# Patient Record
Sex: FEMALE | Race: WHITE | Employment: PART TIME | URBAN - METROPOLITAN AREA
[De-identification: names, ages, dates, MRNs, and addresses within clinical notes are randomized per-mention and may not be internally consistent; named-entity substitution may affect disease eponyms.]

---

## 2017-07-28 ENCOUNTER — HOSPITAL ENCOUNTER (OUTPATIENT)
Dept: PREADMISSION TESTING | Age: 47
Discharge: HOME | End: 2017-07-28
Payer: COMMERCIAL

## 2017-07-28 LAB
ALBUMIN SERPL BCP-MCNC: 4.1 G/DL (ref 3.4–5)
ALBUMIN/GLOB SERPL: 1.3 {RATIO} (ref 0.8–1.7)
ALP SERPL-CCNC: 56 U/L (ref 45–117)
ALT SERPL-CCNC: 24 U/L (ref 13–56)
ANION GAP BLD CALC-SCNC: 6 MMOL/L (ref 3–18)
APPEARANCE UR: CLEAR
APTT PPP: 26.9 SEC (ref 23–36.4)
AST SERPL W P-5'-P-CCNC: 17 U/L (ref 15–37)
BACTERIA SPEC CULT: NORMAL
BACTERIA URNS QL MICRO: ABNORMAL /HPF
BASOPHILS # BLD AUTO: 0 K/UL (ref 0–0.06)
BASOPHILS # BLD: 0 % (ref 0–2)
BILIRUB SERPL-MCNC: 0.6 MG/DL (ref 0.2–1)
BILIRUB UR QL: NEGATIVE
BUN SERPL-MCNC: 14 MG/DL (ref 7–18)
BUN/CREAT SERPL: 15 (ref 12–20)
CALCIUM SERPL-MCNC: 9 MG/DL (ref 8.5–10.1)
CHLORIDE SERPL-SCNC: 105 MMOL/L (ref 100–108)
CO2 SERPL-SCNC: 30 MMOL/L (ref 21–32)
COLOR UR: YELLOW
CREAT SERPL-MCNC: 0.91 MG/DL (ref 0.6–1.3)
DIFFERENTIAL METHOD BLD: ABNORMAL
EOSINOPHIL # BLD: 0.3 K/UL (ref 0–0.4)
EOSINOPHIL NFR BLD: 6 % (ref 0–5)
EPITH CASTS URNS QL MICRO: ABNORMAL /LPF (ref 0–5)
ERYTHROCYTE [DISTWIDTH] IN BLOOD BY AUTOMATED COUNT: 12.8 % (ref 11.6–14.5)
ERYTHROCYTE [SEDIMENTATION RATE] IN BLOOD: 14 MM/HR (ref 0–20)
EST. AVERAGE GLUCOSE BLD GHB EST-MCNC: 97 MG/DL
GLOBULIN SER CALC-MCNC: 3.1 G/DL (ref 2–4)
GLUCOSE SERPL-MCNC: 89 MG/DL (ref 74–99)
GLUCOSE UR STRIP.AUTO-MCNC: NEGATIVE MG/DL
HBA1C MFR BLD: 5 % (ref 4.5–5.6)
HCT VFR BLD AUTO: 39 % (ref 35–45)
HGB BLD-MCNC: 13.2 G/DL (ref 12–16)
HGB UR QL STRIP: ABNORMAL
INR PPP: 1.1 (ref 0.8–1.2)
KETONES UR QL STRIP.AUTO: NEGATIVE MG/DL
LEUKOCYTE ESTERASE UR QL STRIP.AUTO: NEGATIVE
LYMPHOCYTES # BLD AUTO: 30 % (ref 21–52)
LYMPHOCYTES # BLD: 1.7 K/UL (ref 0.9–3.6)
MCH RBC QN AUTO: 29.3 PG (ref 24–34)
MCHC RBC AUTO-ENTMCNC: 33.8 G/DL (ref 31–37)
MCV RBC AUTO: 86.5 FL (ref 74–97)
MONOCYTES # BLD: 0.3 K/UL (ref 0.05–1.2)
MONOCYTES NFR BLD AUTO: 5 % (ref 3–10)
NEUTS SEG # BLD: 3.3 K/UL (ref 1.8–8)
NEUTS SEG NFR BLD AUTO: 59 % (ref 40–73)
NITRITE UR QL STRIP.AUTO: NEGATIVE
PH UR STRIP: 7 [PH] (ref 5–8)
PLATELET # BLD AUTO: 193 K/UL (ref 135–420)
PMV BLD AUTO: 11.2 FL (ref 9.2–11.8)
POTASSIUM SERPL-SCNC: 4.2 MMOL/L (ref 3.5–5.5)
PROT SERPL-MCNC: 7.2 G/DL (ref 6.4–8.2)
PROT UR STRIP-MCNC: NEGATIVE MG/DL
PROTHROMBIN TIME: 14 SEC (ref 11.5–15.2)
RBC # BLD AUTO: 4.51 M/UL (ref 4.2–5.3)
RBC #/AREA URNS HPF: ABNORMAL /HPF (ref 0–5)
SERVICE CMNT-IMP: NORMAL
SODIUM SERPL-SCNC: 141 MMOL/L (ref 136–145)
SP GR UR REFRACTOMETRY: 1.01 (ref 1–1.03)
UROBILINOGEN UR QL STRIP.AUTO: 0.2 EU/DL (ref 0.2–1)
WBC # BLD AUTO: 5.6 K/UL (ref 4.6–13.2)
WBC URNS QL MICRO: ABNORMAL /HPF (ref 0–5)

## 2017-07-28 PROCEDURE — 83036 HEMOGLOBIN GLYCOSYLATED A1C: CPT | Performed by: ORTHOPAEDIC SURGERY

## 2017-07-28 PROCEDURE — 81001 URINALYSIS AUTO W/SCOPE: CPT | Performed by: ORTHOPAEDIC SURGERY

## 2017-07-28 PROCEDURE — 36415 COLL VENOUS BLD VENIPUNCTURE: CPT | Performed by: ORTHOPAEDIC SURGERY

## 2017-07-28 PROCEDURE — 85025 COMPLETE CBC W/AUTO DIFF WBC: CPT | Performed by: ORTHOPAEDIC SURGERY

## 2017-07-28 PROCEDURE — 93005 ELECTROCARDIOGRAM TRACING: CPT

## 2017-07-28 PROCEDURE — 85610 PROTHROMBIN TIME: CPT | Performed by: ORTHOPAEDIC SURGERY

## 2017-07-28 PROCEDURE — 87641 MR-STAPH DNA AMP PROBE: CPT | Performed by: ORTHOPAEDIC SURGERY

## 2017-07-28 PROCEDURE — 85652 RBC SED RATE AUTOMATED: CPT | Performed by: ORTHOPAEDIC SURGERY

## 2017-07-28 PROCEDURE — 80053 COMPREHEN METABOLIC PANEL: CPT | Performed by: ORTHOPAEDIC SURGERY

## 2017-07-28 PROCEDURE — 85730 THROMBOPLASTIN TIME PARTIAL: CPT | Performed by: ORTHOPAEDIC SURGERY

## 2017-07-30 LAB
ATRIAL RATE: 69 BPM
CALCULATED P AXIS, ECG09: 52 DEGREES
CALCULATED R AXIS, ECG10: 65 DEGREES
CALCULATED T AXIS, ECG11: 53 DEGREES
DIAGNOSIS, 93000: NORMAL
P-R INTERVAL, ECG05: 160 MS
Q-T INTERVAL, ECG07: 416 MS
QRS DURATION, ECG06: 86 MS
QTC CALCULATION (BEZET), ECG08: 445 MS
VENTRICULAR RATE, ECG03: 69 BPM

## 2017-08-19 ENCOUNTER — ANESTHESIA EVENT (OUTPATIENT)
Dept: SURGERY | Age: 47
DRG: 470 | End: 2017-08-19
Payer: COMMERCIAL

## 2017-08-20 PROBLEM — M16.11 OSTEOARTHRITIS OF RIGHT HIP: Chronic | Status: ACTIVE | Noted: 2017-08-20

## 2017-08-21 ENCOUNTER — APPOINTMENT (OUTPATIENT)
Dept: GENERAL RADIOLOGY | Age: 47
DRG: 470 | End: 2017-08-21
Attending: PHYSICIAN ASSISTANT
Payer: COMMERCIAL

## 2017-08-21 ENCOUNTER — APPOINTMENT (OUTPATIENT)
Dept: GENERAL RADIOLOGY | Age: 47
DRG: 470 | End: 2017-08-21
Attending: ORTHOPAEDIC SURGERY
Payer: COMMERCIAL

## 2017-08-21 ENCOUNTER — HOSPITAL ENCOUNTER (INPATIENT)
Age: 47
LOS: 1 days | Discharge: HOME HEALTH | DRG: 470 | End: 2017-08-22
Attending: ORTHOPAEDIC SURGERY | Admitting: ORTHOPAEDIC SURGERY
Payer: COMMERCIAL

## 2017-08-21 ENCOUNTER — ANESTHESIA (OUTPATIENT)
Dept: SURGERY | Age: 47
DRG: 470 | End: 2017-08-21
Payer: COMMERCIAL

## 2017-08-21 LAB
ABO + RH BLD: NORMAL
BLOOD GROUP ANTIBODIES SERPL: NORMAL
HCG SERPL QL: NEGATIVE
SPECIMEN EXP DATE BLD: NORMAL

## 2017-08-21 PROCEDURE — 77010033678 HC OXYGEN DAILY

## 2017-08-21 PROCEDURE — 77030027138 HC INCENT SPIROMETER -A: Performed by: ORTHOPAEDIC SURGERY

## 2017-08-21 PROCEDURE — 74011250636 HC RX REV CODE- 250/636: Performed by: ANESTHESIOLOGY

## 2017-08-21 PROCEDURE — 76010000149 HC OR TIME 1 TO 1.5 HR: Performed by: ORTHOPAEDIC SURGERY

## 2017-08-21 PROCEDURE — 76210000006 HC OR PH I REC 0.5 TO 1 HR: Performed by: ORTHOPAEDIC SURGERY

## 2017-08-21 PROCEDURE — 77030018836 HC SOL IRR NACL ICUM -A: Performed by: ORTHOPAEDIC SURGERY

## 2017-08-21 PROCEDURE — 77030031139 HC SUT VCRL2 J&J -A: Performed by: ORTHOPAEDIC SURGERY

## 2017-08-21 PROCEDURE — 77030011640 HC PAD GRND REM COVD -A: Performed by: ORTHOPAEDIC SURGERY

## 2017-08-21 PROCEDURE — 77030003666 HC NDL SPINAL BD -A: Performed by: ORTHOPAEDIC SURGERY

## 2017-08-21 PROCEDURE — 77030038010: Performed by: ORTHOPAEDIC SURGERY

## 2017-08-21 PROCEDURE — 97116 GAIT TRAINING THERAPY: CPT

## 2017-08-21 PROCEDURE — 77030020782 HC GWN BAIR PAWS FLX 3M -B: Performed by: ORTHOPAEDIC SURGERY

## 2017-08-21 PROCEDURE — 74011250636 HC RX REV CODE- 250/636: Performed by: ORTHOPAEDIC SURGERY

## 2017-08-21 PROCEDURE — 74011000250 HC RX REV CODE- 250

## 2017-08-21 PROCEDURE — 74011250636 HC RX REV CODE- 250/636: Performed by: PHYSICIAN ASSISTANT

## 2017-08-21 PROCEDURE — 65270000029 HC RM PRIVATE

## 2017-08-21 PROCEDURE — 77030013708 HC HNDPC SUC IRR PULS STRY –B: Performed by: ORTHOPAEDIC SURGERY

## 2017-08-21 PROCEDURE — C1776 JOINT DEVICE (IMPLANTABLE): HCPCS | Performed by: ORTHOPAEDIC SURGERY

## 2017-08-21 PROCEDURE — 0SR90JA REPLACEMENT OF RIGHT HIP JOINT WITH SYNTHETIC SUBSTITUTE, UNCEMENTED, OPEN APPROACH: ICD-10-PCS | Performed by: ORTHOPAEDIC SURGERY

## 2017-08-21 PROCEDURE — 86900 BLOOD TYPING SEROLOGIC ABO: CPT | Performed by: ORTHOPAEDIC SURGERY

## 2017-08-21 PROCEDURE — 74011250637 HC RX REV CODE- 250/637: Performed by: PHYSICIAN ASSISTANT

## 2017-08-21 PROCEDURE — 73501 X-RAY EXAM HIP UNI 1 VIEW: CPT

## 2017-08-21 PROCEDURE — 77030034479 HC ADH SKN CLSR PRINEO J&J -B: Performed by: ORTHOPAEDIC SURGERY

## 2017-08-21 PROCEDURE — 74011250636 HC RX REV CODE- 250/636

## 2017-08-21 PROCEDURE — 84703 CHORIONIC GONADOTROPIN ASSAY: CPT | Performed by: ANESTHESIOLOGY

## 2017-08-21 PROCEDURE — 97161 PT EVAL LOW COMPLEX 20 MIN: CPT

## 2017-08-21 PROCEDURE — 77030034694 HC SCPL CANADY PLSM DISP USMD -E: Performed by: ORTHOPAEDIC SURGERY

## 2017-08-21 PROCEDURE — 77030012508 HC MSK AIRWY LMA AMBU -A: Performed by: ANESTHESIOLOGY

## 2017-08-21 PROCEDURE — 76060000033 HC ANESTHESIA 1 TO 1.5 HR: Performed by: ORTHOPAEDIC SURGERY

## 2017-08-21 PROCEDURE — 77030033263 HC DRSG MEPILEX 16-48IN BORD MOLN -B: Performed by: ORTHOPAEDIC SURGERY

## 2017-08-21 PROCEDURE — 74011000258 HC RX REV CODE- 258

## 2017-08-21 PROCEDURE — 77030032490 HC SLV COMPR SCD KNE COVD -B: Performed by: ORTHOPAEDIC SURGERY

## 2017-08-21 PROCEDURE — 36415 COLL VENOUS BLD VENIPUNCTURE: CPT | Performed by: ORTHOPAEDIC SURGERY

## 2017-08-21 PROCEDURE — 74011250637 HC RX REV CODE- 250/637: Performed by: ANESTHESIOLOGY

## 2017-08-21 PROCEDURE — 77030002934 HC SUT MCRYL J&J -B: Performed by: ORTHOPAEDIC SURGERY

## 2017-08-21 DEVICE — HEAD FEM DIA36MM +8MM OFFSET 12/14 TAPR HIP CERAMIC BIOLOX: Type: IMPLANTABLE DEVICE | Site: HIP | Status: FUNCTIONAL

## 2017-08-21 DEVICE — CUP ACET DIA54MM HIP GRIPTION PRI CEMENTLESS FIX SECT SER: Type: IMPLANTABLE DEVICE | Site: HIP | Status: FUNCTIONAL

## 2017-08-21 DEVICE — COMPONENT TOT HIP PRIMARY CERM ALTRX: Type: IMPLANTABLE DEVICE | Site: HIP | Status: FUNCTIONAL

## 2017-08-21 DEVICE — LINER ACET OD54MM ID36MM HIP ALTRX PINN: Type: IMPLANTABLE DEVICE | Site: HIP | Status: FUNCTIONAL

## 2017-08-21 RX ORDER — SODIUM CHLORIDE 0.9 % (FLUSH) 0.9 %
5-10 SYRINGE (ML) INJECTION EVERY 8 HOURS
Status: DISCONTINUED | OUTPATIENT
Start: 2017-08-21 | End: 2017-08-22 | Stop reason: HOSPADM

## 2017-08-21 RX ORDER — GLYCOPYRROLATE 0.2 MG/ML
INJECTION INTRAMUSCULAR; INTRAVENOUS AS NEEDED
Status: DISCONTINUED | OUTPATIENT
Start: 2017-08-21 | End: 2017-08-21 | Stop reason: HOSPADM

## 2017-08-21 RX ORDER — ONDANSETRON 2 MG/ML
4 INJECTION INTRAMUSCULAR; INTRAVENOUS
Status: DISCONTINUED | OUTPATIENT
Start: 2017-08-21 | End: 2017-08-22 | Stop reason: HOSPADM

## 2017-08-21 RX ORDER — MIDAZOLAM HYDROCHLORIDE 1 MG/ML
INJECTION, SOLUTION INTRAMUSCULAR; INTRAVENOUS AS NEEDED
Status: DISCONTINUED | OUTPATIENT
Start: 2017-08-21 | End: 2017-08-21 | Stop reason: HOSPADM

## 2017-08-21 RX ORDER — PROPOFOL 10 MG/ML
INJECTION, EMULSION INTRAVENOUS AS NEEDED
Status: DISCONTINUED | OUTPATIENT
Start: 2017-08-21 | End: 2017-08-21 | Stop reason: HOSPADM

## 2017-08-21 RX ORDER — LANOLIN ALCOHOL/MO/W.PET/CERES
1 CREAM (GRAM) TOPICAL 3 TIMES DAILY
Status: DISCONTINUED | OUTPATIENT
Start: 2017-08-21 | End: 2017-08-22 | Stop reason: HOSPADM

## 2017-08-21 RX ORDER — PREGABALIN 50 MG/1
50 CAPSULE ORAL
Status: COMPLETED | OUTPATIENT
Start: 2017-08-21 | End: 2017-08-21

## 2017-08-21 RX ORDER — DEXTROSE 50 % IN WATER (D50W) INTRAVENOUS SYRINGE
25-50 AS NEEDED
Status: DISCONTINUED | OUTPATIENT
Start: 2017-08-21 | End: 2017-08-22 | Stop reason: HOSPADM

## 2017-08-21 RX ORDER — OXYCODONE AND ACETAMINOPHEN 5; 325 MG/1; MG/1
TABLET ORAL
Qty: 60 TAB | Refills: 0 | Status: SHIPPED | OUTPATIENT
Start: 2017-08-21

## 2017-08-21 RX ORDER — KETOROLAC TROMETHAMINE 30 MG/ML
INJECTION, SOLUTION INTRAMUSCULAR; INTRAVENOUS AS NEEDED
Status: DISCONTINUED | OUTPATIENT
Start: 2017-08-21 | End: 2017-08-21 | Stop reason: HOSPADM

## 2017-08-21 RX ORDER — PANTOPRAZOLE SODIUM 40 MG/1
40 TABLET, DELAYED RELEASE ORAL DAILY
Status: DISCONTINUED | OUTPATIENT
Start: 2017-08-21 | End: 2017-08-22 | Stop reason: HOSPADM

## 2017-08-21 RX ORDER — DEXAMETHASONE SODIUM PHOSPHATE 4 MG/ML
INJECTION, SOLUTION INTRA-ARTICULAR; INTRALESIONAL; INTRAMUSCULAR; INTRAVENOUS; SOFT TISSUE AS NEEDED
Status: DISCONTINUED | OUTPATIENT
Start: 2017-08-21 | End: 2017-08-21 | Stop reason: HOSPADM

## 2017-08-21 RX ORDER — LIDOCAINE HYDROCHLORIDE 20 MG/ML
INJECTION, SOLUTION EPIDURAL; INFILTRATION; INTRACAUDAL; PERINEURAL AS NEEDED
Status: DISCONTINUED | OUTPATIENT
Start: 2017-08-21 | End: 2017-08-21 | Stop reason: HOSPADM

## 2017-08-21 RX ORDER — ACETAMINOPHEN 10 MG/ML
1000 INJECTION, SOLUTION INTRAVENOUS EVERY 6 HOURS
Status: DISCONTINUED | OUTPATIENT
Start: 2017-08-21 | End: 2017-08-21

## 2017-08-21 RX ORDER — FENTANYL CITRATE 50 UG/ML
INJECTION, SOLUTION INTRAMUSCULAR; INTRAVENOUS AS NEEDED
Status: DISCONTINUED | OUTPATIENT
Start: 2017-08-21 | End: 2017-08-21 | Stop reason: HOSPADM

## 2017-08-21 RX ORDER — ASPIRIN 325 MG
325 TABLET ORAL 2 TIMES DAILY
Qty: 42 TAB | Refills: 0 | Status: SHIPPED | OUTPATIENT
Start: 2017-08-21 | End: 2017-09-11

## 2017-08-21 RX ORDER — NALOXONE HYDROCHLORIDE 0.4 MG/ML
0.1 INJECTION, SOLUTION INTRAMUSCULAR; INTRAVENOUS; SUBCUTANEOUS AS NEEDED
Status: DISCONTINUED | OUTPATIENT
Start: 2017-08-21 | End: 2017-08-22 | Stop reason: HOSPADM

## 2017-08-21 RX ORDER — SODIUM CHLORIDE 0.9 % (FLUSH) 0.9 %
5-10 SYRINGE (ML) INJECTION AS NEEDED
Status: DISCONTINUED | OUTPATIENT
Start: 2017-08-21 | End: 2017-08-22 | Stop reason: HOSPADM

## 2017-08-21 RX ORDER — DIPHENHYDRAMINE HYDROCHLORIDE 50 MG/ML
12.5 INJECTION, SOLUTION INTRAMUSCULAR; INTRAVENOUS
Status: DISCONTINUED | OUTPATIENT
Start: 2017-08-21 | End: 2017-08-22 | Stop reason: HOSPADM

## 2017-08-21 RX ORDER — OXYCODONE HYDROCHLORIDE 5 MG/1
5-10 TABLET ORAL
Status: DISCONTINUED | OUTPATIENT
Start: 2017-08-21 | End: 2017-08-22 | Stop reason: HOSPADM

## 2017-08-21 RX ORDER — DOCUSATE SODIUM 100 MG/1
100 CAPSULE, LIQUID FILLED ORAL 2 TIMES DAILY
Status: DISCONTINUED | OUTPATIENT
Start: 2017-08-21 | End: 2017-08-22 | Stop reason: HOSPADM

## 2017-08-21 RX ORDER — METOCLOPRAMIDE HYDROCHLORIDE 5 MG/ML
10 INJECTION INTRAMUSCULAR; INTRAVENOUS
Status: DISCONTINUED | OUTPATIENT
Start: 2017-08-21 | End: 2017-08-22 | Stop reason: HOSPADM

## 2017-08-21 RX ORDER — SODIUM CHLORIDE 9 MG/ML
300 INJECTION, SOLUTION INTRAVENOUS CONTINUOUS
Status: DISPENSED | OUTPATIENT
Start: 2017-08-21 | End: 2017-08-21

## 2017-08-21 RX ORDER — ACETAMINOPHEN 10 MG/ML
1000 INJECTION, SOLUTION INTRAVENOUS ONCE
Status: COMPLETED | OUTPATIENT
Start: 2017-08-21 | End: 2017-08-21

## 2017-08-21 RX ORDER — ONDANSETRON 2 MG/ML
INJECTION INTRAMUSCULAR; INTRAVENOUS AS NEEDED
Status: DISCONTINUED | OUTPATIENT
Start: 2017-08-21 | End: 2017-08-21 | Stop reason: HOSPADM

## 2017-08-21 RX ORDER — OXYCODONE HCL 10 MG/1
10 TABLET, FILM COATED, EXTENDED RELEASE ORAL ONCE
Status: COMPLETED | OUTPATIENT
Start: 2017-08-21 | End: 2017-08-21

## 2017-08-21 RX ORDER — KETOROLAC TROMETHAMINE 15 MG/ML
15 INJECTION, SOLUTION INTRAMUSCULAR; INTRAVENOUS EVERY 6 HOURS
Status: DISCONTINUED | OUTPATIENT
Start: 2017-08-21 | End: 2017-08-22 | Stop reason: HOSPADM

## 2017-08-21 RX ORDER — ASPIRIN 325 MG/1
325 TABLET, FILM COATED ORAL
Status: DISCONTINUED | OUTPATIENT
Start: 2017-08-21 | End: 2017-08-22 | Stop reason: HOSPADM

## 2017-08-21 RX ORDER — CEFAZOLIN SODIUM 2 G/50ML
2 SOLUTION INTRAVENOUS EVERY 8 HOURS
Status: COMPLETED | OUTPATIENT
Start: 2017-08-21 | End: 2017-08-22

## 2017-08-21 RX ORDER — LORATADINE 10 MG/1
10 TABLET ORAL DAILY PRN
Status: DISCONTINUED | OUTPATIENT
Start: 2017-08-21 | End: 2017-08-22 | Stop reason: HOSPADM

## 2017-08-21 RX ORDER — MELATONIN
2000 DAILY
Status: DISCONTINUED | OUTPATIENT
Start: 2017-08-21 | End: 2017-08-22 | Stop reason: HOSPADM

## 2017-08-21 RX ORDER — SODIUM CHLORIDE, SODIUM LACTATE, POTASSIUM CHLORIDE, CALCIUM CHLORIDE 600; 310; 30; 20 MG/100ML; MG/100ML; MG/100ML; MG/100ML
125 INJECTION, SOLUTION INTRAVENOUS CONTINUOUS
Status: DISCONTINUED | OUTPATIENT
Start: 2017-08-21 | End: 2017-08-22 | Stop reason: HOSPADM

## 2017-08-21 RX ORDER — DIPHENHYDRAMINE HCL 25 MG
25 CAPSULE ORAL
Status: DISCONTINUED | OUTPATIENT
Start: 2017-08-21 | End: 2017-08-22 | Stop reason: HOSPADM

## 2017-08-21 RX ORDER — MELOXICAM 7.5 MG/1
7.5 TABLET ORAL 2 TIMES DAILY
Status: DISCONTINUED | OUTPATIENT
Start: 2017-08-21 | End: 2017-08-22 | Stop reason: HOSPADM

## 2017-08-21 RX ORDER — ZOLPIDEM TARTRATE 5 MG/1
5-10 TABLET ORAL
Status: DISCONTINUED | OUTPATIENT
Start: 2017-08-21 | End: 2017-08-22 | Stop reason: HOSPADM

## 2017-08-21 RX ORDER — HYDROMORPHONE HYDROCHLORIDE 2 MG/ML
INJECTION, SOLUTION INTRAMUSCULAR; INTRAVENOUS; SUBCUTANEOUS AS NEEDED
Status: DISCONTINUED | OUTPATIENT
Start: 2017-08-21 | End: 2017-08-21 | Stop reason: HOSPADM

## 2017-08-21 RX ORDER — ACETAMINOPHEN 325 MG/1
650 TABLET ORAL EVERY 6 HOURS
Status: DISCONTINUED | OUTPATIENT
Start: 2017-08-21 | End: 2017-08-22 | Stop reason: HOSPADM

## 2017-08-21 RX ORDER — MELOXICAM 7.5 MG/1
7.5 TABLET ORAL 2 TIMES DAILY
Qty: 28 TAB | Refills: 0 | Status: SHIPPED | OUTPATIENT
Start: 2017-08-21 | End: 2017-09-04

## 2017-08-21 RX ORDER — HYDROMORPHONE HYDROCHLORIDE 1 MG/ML
0.5 INJECTION, SOLUTION INTRAMUSCULAR; INTRAVENOUS; SUBCUTANEOUS
Status: DISCONTINUED | OUTPATIENT
Start: 2017-08-21 | End: 2017-08-22 | Stop reason: HOSPADM

## 2017-08-21 RX ORDER — MAGNESIUM SULFATE 100 %
4 CRYSTALS MISCELLANEOUS AS NEEDED
Status: DISCONTINUED | OUTPATIENT
Start: 2017-08-21 | End: 2017-08-22 | Stop reason: HOSPADM

## 2017-08-21 RX ORDER — FENTANYL CITRATE 50 UG/ML
50 INJECTION, SOLUTION INTRAMUSCULAR; INTRAVENOUS AS NEEDED
Status: DISCONTINUED | OUTPATIENT
Start: 2017-08-21 | End: 2017-08-22 | Stop reason: HOSPADM

## 2017-08-21 RX ORDER — SODIUM CHLORIDE 9 MG/ML
125 INJECTION, SOLUTION INTRAVENOUS CONTINUOUS
Status: DISPENSED | OUTPATIENT
Start: 2017-08-21 | End: 2017-08-22

## 2017-08-21 RX ORDER — CEFAZOLIN SODIUM 2 G/50ML
2 SOLUTION INTRAVENOUS ONCE
Status: COMPLETED | OUTPATIENT
Start: 2017-08-21 | End: 2017-08-21

## 2017-08-21 RX ORDER — LORATADINE 10 MG/1
10 TABLET ORAL DAILY
Status: DISCONTINUED | OUTPATIENT
Start: 2017-08-21 | End: 2017-08-22 | Stop reason: HOSPADM

## 2017-08-21 RX ORDER — NALOXONE HYDROCHLORIDE 0.4 MG/ML
0.4 INJECTION, SOLUTION INTRAMUSCULAR; INTRAVENOUS; SUBCUTANEOUS AS NEEDED
Status: DISCONTINUED | OUTPATIENT
Start: 2017-08-21 | End: 2017-08-22 | Stop reason: HOSPADM

## 2017-08-21 RX ORDER — CALCIUM CARBONATE 200(500)MG
200 TABLET,CHEWABLE ORAL DAILY PRN
Status: DISCONTINUED | OUTPATIENT
Start: 2017-08-21 | End: 2017-08-22 | Stop reason: HOSPADM

## 2017-08-21 RX ORDER — FLUTICASONE PROPIONATE 50 MCG
2 SPRAY, SUSPENSION (ML) NASAL DAILY PRN
Status: DISCONTINUED | OUTPATIENT
Start: 2017-08-21 | End: 2017-08-22 | Stop reason: HOSPADM

## 2017-08-21 RX ORDER — FLUMAZENIL 0.1 MG/ML
0.2 INJECTION INTRAVENOUS
Status: DISCONTINUED | OUTPATIENT
Start: 2017-08-21 | End: 2017-08-22 | Stop reason: HOSPADM

## 2017-08-21 RX ADMIN — SODIUM CHLORIDE, SODIUM LACTATE, POTASSIUM CHLORIDE, AND CALCIUM CHLORIDE 125 ML/HR: 600; 310; 30; 20 INJECTION, SOLUTION INTRAVENOUS at 06:41

## 2017-08-21 RX ADMIN — KETOROLAC TROMETHAMINE 15 MG: 15 INJECTION, SOLUTION INTRAMUSCULAR; INTRAVENOUS at 20:40

## 2017-08-21 RX ADMIN — ACETAMINOPHEN 1000 MG: 10 INJECTION, SOLUTION INTRAVENOUS at 07:30

## 2017-08-21 RX ADMIN — FENTANYL CITRATE 50 MCG: 50 INJECTION, SOLUTION INTRAMUSCULAR; INTRAVENOUS at 08:24

## 2017-08-21 RX ADMIN — PREGABALIN 50 MG: 50 CAPSULE ORAL at 07:11

## 2017-08-21 RX ADMIN — OXYCODONE HYDROCHLORIDE 5 MG: 5 TABLET ORAL at 18:37

## 2017-08-21 RX ADMIN — FENTANYL CITRATE 50 MCG: 50 INJECTION, SOLUTION INTRAMUSCULAR; INTRAVENOUS at 08:00

## 2017-08-21 RX ADMIN — METOCLOPRAMIDE 10 MG: 5 INJECTION, SOLUTION INTRAMUSCULAR; INTRAVENOUS at 10:22

## 2017-08-21 RX ADMIN — ACETAMINOPHEN 650 MG: 325 TABLET ORAL at 14:36

## 2017-08-21 RX ADMIN — ONDANSETRON 4 MG: 2 INJECTION INTRAMUSCULAR; INTRAVENOUS at 17:28

## 2017-08-21 RX ADMIN — ACETAMINOPHEN 650 MG: 325 TABLET ORAL at 20:40

## 2017-08-21 RX ADMIN — CEFAZOLIN SODIUM 2 G: 2 SOLUTION INTRAVENOUS at 23:35

## 2017-08-21 RX ADMIN — PROPOFOL 200 MG: 10 INJECTION, EMULSION INTRAVENOUS at 07:38

## 2017-08-21 RX ADMIN — KETOROLAC TROMETHAMINE 15 MG: 15 INJECTION, SOLUTION INTRAMUSCULAR; INTRAVENOUS at 14:35

## 2017-08-21 RX ADMIN — MELOXICAM 7.5 MG: 7.5 TABLET ORAL at 20:40

## 2017-08-21 RX ADMIN — FERROUS SULFATE TAB 325 MG (65 MG ELEMENTAL FE) 325 MG: 325 (65 FE) TAB at 20:45

## 2017-08-21 RX ADMIN — SODIUM CHLORIDE, SODIUM LACTATE, POTASSIUM CHLORIDE, AND CALCIUM CHLORIDE 125 ML/HR: 600; 310; 30; 20 INJECTION, SOLUTION INTRAVENOUS at 09:26

## 2017-08-21 RX ADMIN — ONDANSETRON 4 MG: 2 INJECTION INTRAMUSCULAR; INTRAVENOUS at 09:29

## 2017-08-21 RX ADMIN — DEXAMETHASONE SODIUM PHOSPHATE 4 MG: 4 INJECTION, SOLUTION INTRA-ARTICULAR; INTRALESIONAL; INTRAMUSCULAR; INTRAVENOUS; SOFT TISSUE at 07:40

## 2017-08-21 RX ADMIN — HYDROMORPHONE HYDROCHLORIDE 1 MG: 2 INJECTION, SOLUTION INTRAMUSCULAR; INTRAVENOUS; SUBCUTANEOUS at 07:38

## 2017-08-21 RX ADMIN — OXYCODONE HYDROCHLORIDE 10 MG: 10 TABLET, FILM COATED, EXTENDED RELEASE ORAL at 07:11

## 2017-08-21 RX ADMIN — FENTANYL CITRATE 100 MCG: 50 INJECTION, SOLUTION INTRAMUSCULAR; INTRAVENOUS at 07:38

## 2017-08-21 RX ADMIN — FERROUS SULFATE TAB 325 MG (65 MG ELEMENTAL FE) 325 MG: 325 (65 FE) TAB at 10:24

## 2017-08-21 RX ADMIN — SODIUM CHLORIDE 125 ML/HR: 900 INJECTION, SOLUTION INTRAVENOUS at 20:42

## 2017-08-21 RX ADMIN — KETOROLAC TROMETHAMINE 30 MG: 30 INJECTION, SOLUTION INTRAMUSCULAR; INTRAVENOUS at 08:41

## 2017-08-21 RX ADMIN — PANTOPRAZOLE SODIUM 40 MG: 40 TABLET, DELAYED RELEASE ORAL at 07:11

## 2017-08-21 RX ADMIN — MELOXICAM 7.5 MG: 7.5 TABLET ORAL at 10:24

## 2017-08-21 RX ADMIN — ASPIRIN 325 MG: 325 TABLET, FILM COATED ORAL at 17:27

## 2017-08-21 RX ADMIN — METOCLOPRAMIDE 10 MG: 5 INJECTION, SOLUTION INTRAMUSCULAR; INTRAVENOUS at 14:36

## 2017-08-21 RX ADMIN — GLYCOPYRROLATE 0.2 MG: 0.2 INJECTION INTRAMUSCULAR; INTRAVENOUS at 07:30

## 2017-08-21 RX ADMIN — OXYCODONE HYDROCHLORIDE 5 MG: 5 TABLET ORAL at 14:36

## 2017-08-21 RX ADMIN — ONDANSETRON 4 MG: 2 INJECTION INTRAMUSCULAR; INTRAVENOUS at 07:40

## 2017-08-21 RX ADMIN — CEFAZOLIN SODIUM 2 G: 2 SOLUTION INTRAVENOUS at 07:32

## 2017-08-21 RX ADMIN — CEFAZOLIN SODIUM 2 G: 2 SOLUTION INTRAVENOUS at 15:25

## 2017-08-21 RX ADMIN — FENTANYL CITRATE 50 MCG: 50 INJECTION, SOLUTION INTRAMUSCULAR; INTRAVENOUS at 08:07

## 2017-08-21 RX ADMIN — LORATADINE 10 MG: 10 TABLET ORAL at 10:25

## 2017-08-21 RX ADMIN — FERROUS SULFATE TAB 325 MG (65 MG ELEMENTAL FE) 325 MG: 325 (65 FE) TAB at 15:25

## 2017-08-21 RX ADMIN — ONDANSETRON 4 MG: 2 INJECTION INTRAMUSCULAR; INTRAVENOUS at 22:49

## 2017-08-21 RX ADMIN — DOCUSATE SODIUM 100 MG: 100 CAPSULE, LIQUID FILLED ORAL at 10:24

## 2017-08-21 RX ADMIN — SODIUM CHLORIDE, SODIUM LACTATE, POTASSIUM CHLORIDE, AND CALCIUM CHLORIDE 1000 ML: 600; 310; 30; 20 INJECTION, SOLUTION INTRAVENOUS at 06:41

## 2017-08-21 RX ADMIN — SODIUM CHLORIDE, SODIUM LACTATE, POTASSIUM CHLORIDE, AND CALCIUM CHLORIDE: 600; 310; 30; 20 INJECTION, SOLUTION INTRAVENOUS at 07:40

## 2017-08-21 RX ADMIN — DOCUSATE SODIUM 100 MG: 100 CAPSULE, LIQUID FILLED ORAL at 20:40

## 2017-08-21 RX ADMIN — LIDOCAINE HYDROCHLORIDE 80 MG: 20 INJECTION, SOLUTION EPIDURAL; INFILTRATION; INTRACAUDAL; PERINEURAL at 07:38

## 2017-08-21 RX ADMIN — SODIUM CHLORIDE 125 ML/HR: 900 INJECTION, SOLUTION INTRAVENOUS at 14:37

## 2017-08-21 RX ADMIN — MIDAZOLAM HYDROCHLORIDE 2 MG: 1 INJECTION, SOLUTION INTRAMUSCULAR; INTRAVENOUS at 07:30

## 2017-08-21 RX ADMIN — OXYCODONE HYDROCHLORIDE 5 MG: 5 TABLET ORAL at 10:25

## 2017-08-21 RX ADMIN — SODIUM CHLORIDE 300 ML/HR: 900 INJECTION, SOLUTION INTRAVENOUS at 10:30

## 2017-08-21 NOTE — PROGRESS NOTES
1000 Received client from PACU in satisfactory condition. Client is A/O X 4. Client is calm and cooperative. . No numbness or tingling to the extremities. Skin is warm , dry and skin color is appropriate to race. Bibasilar breath sounds clear. Bowel sounds active . Abdomen is soft and non-tender. Client has not voided post-operatively. No bladder distention evident. No complaints of bladder discomfort. Client has a mepilex dressing to the right Hip. Dressing is CDI. HOMAR and SCDS applied bilaterally. Client has 18 gauge IV present in LFA and running NS. Clients pain is 6/10 on 0-10 scale. Client oriented to call bell use as well as bed use. Client oriented to phone and how to order meals. Call bell within reach. Bed in low position. Three side rails up. 1025 Pt state pain as 7/10. Pt received medication as per MAR. Potential medication side effects explained to patient, patient verbalizes understanding, opportunities for questions provided. Patient stable, no apparent distress at this time, bed in locked position, call bell within reach. Gave Reglan for nausea    1300 pt eating her lunch, nausea subsided, no concern at this time, call light within reach. 1437 Pt state pain as 3/10. Pt received medication as per MAR. Potential medication side effects explained to patient, patient verbalizes understanding, opportunities for questions provided. Patient stable, no apparent distress at this time, bed in locked position, call bell within reach. 1730 gave Zofran for nausea    1837 Pt state pain as 3/10. Pt received medication as per MAR. Potential medication side effects explained to patient, patient verbalizes understanding, opportunities for questions provided. Patient stable, no apparent distress at this time, bed in locked position, call bell within reach. Shift summary: Patient had uneventful shift. Patient ambulated with assistance using walker. Pain remained well-controlled with medication.  No issues/concerns at this time

## 2017-08-21 NOTE — PERIOP NOTES
Patient arrived to PACU at 7600 [place] received from [facility] , [facility] and [facility] regarding patient . Surgeon(s):Juan M and Procedure(s): verfied   Confirmed with allergies and dressings discussed. Anesthesia type, drugs, patient history, complications, estimated blood loss, vital signs, intake and output, and last pain medication, lines, reversal medications and temperature were reviewed. PACU monitoring initiated. See doc flow sheet for detailed physical assessment.

## 2017-08-21 NOTE — ANESTHESIA PREPROCEDURE EVALUATION
Anesthetic History   No history of anesthetic complications            Review of Systems / Medical History  Patient summary reviewed, nursing notes reviewed and pertinent labs reviewed    Pulmonary            Asthma : well controlled       Neuro/Psych   Within defined limits           Cardiovascular  Within defined limits                Exercise tolerance: >4 METS     GI/Hepatic/Renal     GERD: well controlled           Endo/Other        Arthritis     Other Findings              Physical Exam    Airway  Mallampati: II  TM Distance: 4 - 6 cm  Neck ROM: normal range of motion   Mouth opening: Normal     Cardiovascular    Rhythm: regular  Rate: normal         Dental         Pulmonary  Breath sounds clear to auscultation               Abdominal  GI exam deferred       Other Findings            Anesthetic Plan    ASA: 2  Anesthesia type: general          Induction: Intravenous  Anesthetic plan and risks discussed with: Patient

## 2017-08-21 NOTE — PROGRESS NOTES
conducted a pre-surgery visit with Nola Villegas, who is a 52 y.o.,female. The  provided the following Interventions:  Initiated a relationship of care and support. Offered prayer and assurance of continued prayers on patient's behalf. Plan:  Chaplains will continue to follow and will provide pastoral care on an as needed/requested basis.  recommends bedside caregivers page  on duty if patient shows signs of acute spiritual or emotional distress.     [address] [facility],[address], [address] [facility] office  [phone] pager

## 2017-08-21 NOTE — IP AVS SNAPSHOT
[facility] 
 
 
 [address] [facility] [address] 
[phone] Patient: Jose Rivas MRN: [id number] Current Discharge Medication List  
  
START taking these medications Dose & Instructions Dispensing Information Comments Morning Noon Evening Bedtime  
 aspirin 325 mg tablet Commonly known as:  ASPIRIN Your last dose was: Your next dose is:    
   
   
 Dose:  325 mg Take 1 Tab by mouth two (2) times a day for 21 days. Quantity:  42 Tab Refills:  0  
     
   
   
   
  
 meloxicam 7.5 mg tablet Commonly known as:  MOBIC Your last dose was: Your next dose is:    
   
   
 Dose:  7.5 mg Take 1 Tab by mouth two (2) times a day for 14 days. Quantity:  28 Tab Refills:  0  
     
   
   
   
  
 oxyCODONE-acetaminophen 5-325 mg per tablet Commonly known as:  PERCOCET Your last dose was: Your next dose is: Take 1-2 tablets by mouth every 4-6 hours as needed for pain. Quantity:  60 Tab Refills:  0 CONTINUE these medications which have NOT CHANGED Dose & Instructions Dispensing Information Comments Morning Noon Evening Bedtime  
 calcium carbonate 200 mg calcium (500 mg) Chew Commonly known as:  TUMS Your last dose was: Your next dose is:    
   
   
 Dose:  1 Tab Take 1 Tab by mouth as needed. Indications: HEARTBURN Refills:  0 Cholecalciferol (Vitamin D3) 2,000 unit Cap capsule Commonly known as:  VITAMIN D3 Your last dose was: Your next dose is:    
   
   
 Dose:  2000 Units Take 2,000 Units by mouth daily. Refills:  0  
     
   
   
   
  
 fexofenadine 180 mg tablet Commonly known as:  [place] Mail Your last dose was: Your next dose is:    
   
   
 Dose:  180 mg Take 180 mg by mouth daily. Refills:  0 FLONASE 50 mcg/actuation nasal spray Generic drug:  fluticasone Your last dose was: Your next dose is:    
   
   
 Dose:  2 Spray 2 Sprays by Both Nostrils route as needed for Rhinitis. Refills:  0  
     
   
   
   
  
 multivitamin tablet Commonly known as:  ONE A DAY Your last dose was: Your next dose is:    
   
   
 Dose:  1 Tab Take 1 Tab by mouth daily. Refills:  0 STOP taking these medications   
 diclofenac potassium 50 mg tablet Commonly known as:  CATAFLAM  
   
  
  
  
Where to Get Your Medications Information on where to get these meds will be given to you by the nurse or doctor. ! Ask your nurse or doctor about these medications  
  aspirin 325 mg tablet  
 meloxicam 7.5 mg tablet  
 oxyCODONE-acetaminophen 5-325 mg per tablet

## 2017-08-21 NOTE — ROUTINE PROCESS
Bedside shift change report given to lauren SULLIVANRN (oncoming nurse) by Debbie GUTIERREZ RN (offgoing nurse). Report included the following information SBAR, Kardex and MAR.

## 2017-08-21 NOTE — PROGRESS NOTES
Problem: Falls - Risk of  Goal: *Absence of Falls  Document Anatoliy Fall Risk and appropriate interventions in the flowsheet.    Outcome: Progressing Towards Goal  Fall Risk Interventions:  Mobility Interventions: OT consult for ADLs, Patient to call before getting OOB, PT Consult for mobility concerns, Utilize walker, cane, or other assitive device, PT Consult for assist device competence     Mentation Interventions: Adequate sleep, hydration, pain control     Medication Interventions: Assess postural VS orthostatic hypotension, Teach patient to arise slowly, Patient to call before getting OOB     Elimination Interventions: Call light in reach, Elevated toilet seat, Patient to call for help with toileting needs, Toileting schedule/hourly rounds

## 2017-08-21 NOTE — PERIOP NOTES
Bedside report to receiving nurse Judith Sinclair , current vital signs noted below. Dressing dry and intact. Family in waiting room. No further questions from receiving nurse.

## 2017-08-21 NOTE — INTERVAL H&P NOTE
H&P Update: Roz Bridges was seen and examined. History and physical has been reviewed. The patient has been examined.  There have been no significant clinical changes since the completion of the originally dated History and Physical.    Signed By: Kerry Haile MD     August 21, 2017 7:09 AM

## 2017-08-21 NOTE — PROGRESS NOTES
Problem: Mobility Impaired (Adult and Pediatric)  Goal: *Acute Goals and Plan of Care (Insert Text)  In 1-7 days pt will be able to perform:  ST. Bed mobility: Rolling L to R to L modified independent for positioning. 2. Supine to sit to supine S with HR for meals. 3. Sit to stand to sit S with RW in prep for ambulation. LT. Gait: Ambulate >150ft S with RW, WBAT, for home/community mobility. 2. Stair Negotiation: Ascend/descend >3 steps CGA with HR for home entry. 3. Activity tolerance: Tolerate up in chair 1-2 hours for ADLs. 4. Patient/Family Education: Patient/family to be independent with HEP for follow-up care and safe discharge. PHYSICAL THERAPY EVALUATION     Patient: Yasmeen Haque (55 y.o. female)  Date: 2017  Primary Diagnosis: UNILATERAL PRIMARY OSTEOARTHRITIS,RIGHT HIP  Osteoarthritis of right hip  Procedure(s) (LRB):  RIGHT TOTAL HIP REPLACEMENT ANTERIOR APPROACH W/C-ARM (Right) Day of Surgery   Precautions:   Fall, WBAT      ASSESSMENT :  Based on the objective data described below, the patient presents with decreased functional mobility and independence in regard to bed mobility, transfers, gt quality and tolerance, R hip strength, pain, stair negotiation and safety due to recent R RANDAL surgery. Pt rating pain in R hip 5/10 on numerical pain scale. Pt very drowsy and c/o nausea throughout evaluation, nurse Darin Kurtz aware. Pt able to follow commands and participate in sit<>stand transfers min A. Pt able to participate in gt training w/ RW, WBAT, GB and min A w/ antalgic pattern. Pt returned to supine in bed w/ all needs within reach, SCDs applied and ice pack to R hip. Nurse Darin Kurtz aware. Recommend [facility] upon hospital d/c. Pt will need RW for personal use. Patient will benefit from skilled intervention to address the above impairments.   Patients rehabilitation potential is considered to be Good  Factors which may influence rehabilitation potential include:   [ ] None noted  [ ]         Mental ability/status  [ ]         Medical condition  [ ]         Home/family situation and support systems  [ ]         Safety awareness  [X]         Pain tolerance/management  [ ]         Other:        PLAN :  Recommendations and Planned Interventions:  [X]           Bed Mobility Training             [ ]    Neuromuscular Re-Education  [X]           Transfer Training                   [ ]    Orthotic/Prosthetic Training  [X]           Gait Training                          [ ]    Modalities  [X]           Therapeutic Exercises          [ ]    Edema Management/Control  [X]           Therapeutic Activities            [X]    Patient and Family Training/Education  [ ]           Other (comment):     Frequency/Duration: Patient will be followed by physical therapy twice daily to address goals. Discharge Recommendations: Home Health  Further Equipment Recommendations for Discharge: rolling walker       SUBJECTIVE:   Patient stated I feel nauseaous.       OBJECTIVE DATA SUMMARY:       Past Medical History:   Diagnosis Date    Arthritis      Asthma       seasonal    Chronic pain      GERD (gastroesophageal reflux disease)      Osteoarthritis of right hip 8/20/2017     Past Surgical History:   Procedure Laterality Date    HX BREAST LUMPECTOMY   2002     right     HX GYN         ablation     Barriers to Learning/Limitations: None  Compensate with: visual, verbal, tactile, kinesthetic cues/model  Prior Level of Function/Home Situation:   Home Situation  Home Environment: Private residence  # Steps to Enter: 4  Rails to Enter: Yes  Hand Rails : Bilateral  One/Two Story Residence: One story  Living Alone: No  Support Systems: Spouse/Significant Other/Partner  Patient Expects to be Discharged to[de-identified] Private residence  Current DME Used/Available at Home: None  Critical Behavior:  Neurologic State: Appropriate for age;Drowsy  Orientation Level: Oriented to person;Oriented to place;Oriented to situation  Cognition: Appropriate decision making; Appropriate for age attention/concentration; Appropriate safety awareness; Follows commands  Safety/Judgement: Awareness of environment  Psychosocial  Patient Behaviors: Calm; Cooperative  Purposeful Interaction: Yes  Pt Identified Daily Priority: Clinical issues (comment)  Car[facility] Process: Teaching/learning;Establish trust;Attend basic human needs  Caring Interventions: Reassure; Therapeutic modalities  Reassure: Therapeutic listening; Informing; Acceptance  Therapeutic Modalities: Deep breathing; Intentional therapeutic touch  Skin Condition/Temp: Dry;Warm  Skin Integrity: Incision (comment) (R hip)  Skin Integumentary  Skin Color: Appropriate for ethnicity  Skin Condition/Temp: Dry;Warm  Skin Integrity: Incision (comment) (R hip)  Turgor: Non-tenting  Hair Growth: Present  Varicosities: Absent  Strength:    Strength: Generally decreased, functional  Tone & Sensation:   Tone: Normal  Sensation: Intact  Range Of Motion:  AROM: Generally decreased, functional  Functional Mobility:  Bed Mobility:  Supine to Sit: Minimum assistance; Additional time (vc)  Sit to Supine: Minimum assistance; Additional time (vc)  Scooting: Contact guard assistance; Additional time (vc)  Transfers:  Sit to Stand: Minimum assistance; Additional time (vc)  Stand to Sit: Minimum assistance (vc)  Balance:   Sitting: Intact  Standing: Intact; With support  Ambulation/Gait Training:  Distance (ft): 3 Feet (ft)  Assistive Device: Walker, rolling;Gait belt  Ambulation - Level of Assistance: Minimal assistance (vc)  Gait Abnormalities: Antalgic;Decreased step clearance  Right Side Weight Bearing: As tolerated  Base of Support: Shift to left  Stance: Right decreased  Speed/Parris: Slow  Step Length: Left shortened;Right shortened  Swing Pattern: Left asymmetrical;Right asymmetrical  Interventions: Safety awareness training;Verbal cues  Therapeutic Exercises:   HEP written copy issued to pt per MD protocol. Pain:  Pain Scale 1: Numeric (0 - 10)  Pain Intensity 1: 5  Pain Location 1: Hip  Pain Orientation 1: Right  Pain Description 1: Aching  Pain Intervention(s) 1: Medication (see MAR)  Activity Tolerance:   Fair   Please refer to the flowsheet for vital signs taken during this treatment. After treatment:   [ ]         Patient left in no apparent distress sitting up in chair  [X]         Patient left in no apparent distress in bed  [X]         Call bell left within reach  [X]         Nursing notified  [ ]         Caregiver present  [ ]         Bed alarm activated      COMMUNICATION/EDUCATION:   [X]         Fall prevention education was provided and the patient/caregiver indicated understanding. [X]         Patient/family have participated as able in goal setting and plan of care. [X]         Patient/family agree to work toward stated goals and plan of care. [ ]         Patient understands intent and goals of therapy, but is neutral about his/her participation. [ ]         Patient is unable to participate in goal setting and plan of care.      Thank you for this referral.  Deana Askew, PT   Time Calculation: 29 mins  Eval Complexity: History: LOW Complexity : Zero comorbidities / personal factors that will impact the outcome / POCExam:MEDIUM Complexity : 3 Standardized tests and measures addressing body structure, function, activity limitation and / or participation in recreation  Presentation: MEDIUM Complexity : Evolving with changing characteristics  Clinical Decision Making:Medium Complexity amb <30' Overall Complexity:LOW

## 2017-08-21 NOTE — OP NOTES
[address] [address],[address]  Total Hip Arthroplasty      Patient: Fadi Corbin MRN: [id number]  SSN: [id number]    YOB: 1970  Age: 52 y.o. Sex: female      Date of Surgery: 8/21/2017   Preoperative Diagnosis: UNILATERAL PRIMARY OSTEOARTHRITIS,RIGHT HIP   Postoperative Diagnosis: UNILATERAL PRIMARY OSTEOARTHRITIS,RIGHT HIP   [facility]  Surgeon: Lynann Lesch, MD  Assistant: Joey Burns PA-C    Anesthesia: general    Procedure: Total Right Hip Arthroplasty    Findings: Degenerative joint disease of the right hip. Estimated Blood Loss: 300ml    Specimens: None    Implants:   Implant Name Type Inv. Item Serial No.  Lot No. LRB No. Used Action   CUP ACET SECTOR GRIPTION 54MM -- TI - [id number]  CUP ACET SECTOR GRIPTION 54MM -- TI  [facility] [employer] [id number] Right 1 Implanted   LINER ACET PINN NEUT [id number] -- ALTRX - [id number]  LINER ACET PINN NEUT [id number] -- ALTRX  [facility] [id number] Right 1 Implanted   HEAD FEM CER ARTC EZ 36M+8.5 -- [employer] DELTA - [id number]  HEAD FEM CER ARTC EZ 36M+8.5 -- [employer] DELTA  [facility] [employer] [id number] Right 1 Implanted   FEMORAL STEM 12/14 TAPER ACTIS DUOFIX HIP PROSTHESIS CEMENTLESS       [employer] [id number] Right 1 Implanted       Procedure Detail:  After the patient was brought to the operating suite, She was effectively anesthetized using general anesthesia, then transferred to the [place] table and secured in a standard fashion. Her right hip was then prepped and draped in a normal sterile orthopedic fashion. She was given appropriate intravenous antibiotics preoperatively. After a proper timeout was performed, a direct anterior approach to the hip was performed using a short Garcia-Montes interval. Anterior capsulotomy was performed. The degenerative changes of the hip were noted. Femoral neck osteotomy was then performed to the templated area.  The head and neck were removed. The pulvinar and labrum were excised. The acetabulum was then reamed up to 53 mm with good bleeding cancellous bone obtained. The cup was then irrigated with pulse lavage system. A 54 mm Gription cup was then impacted in place with excellent stable fixation obtained, placing the cup at about 45 degrees of abduction, 20 degrees of anteversion. The liner was then impacted in place. A screw was not placed. Attention was turned to the femur, which was delivered into the wound with a combination of extension, external rotation, and adduction, and using the hook on the [place] table to deliver the femur into the wound. The canal was broached up to a size 7 for the Actis stem system with excellent stable fixation obtained. A trial reduction was then performed with the standard neck offset and 36 mm head balls with various neck lengths. With the +8.5, she appeared to have equalization of leg lengths and restoration of offset radiographically, and excellent functional stability was noted. The trial broach was removed. The canal was irrigated with the pulse lavage system. The final components were impacted in place with excellent stable fixation obtained once again. The final reduction was performed and once again leg lengths and offset were restored radiographically, using the C-arm radiographically intraoperatively, and excellent functional stability was noted. The wound was then irrigated one more time, and then closed in layers. The fascia of the tensor was closed with #1 Vicryl in a running type stitch. Subcutaneous tissue was closed with 2-0 Vicryl in a simple buried stitch, and the skin was closed with Prineo. Dry, sterile dressing was then applied. She tolerated this well, was transferred to the bed, and taken to recovery room, extubated, in stable condition. All sponge and needle counts were correct.     Signed By: Delfina Segura MD     August 21, 2017

## 2017-08-21 NOTE — H&P
[address] [facility] [address]  History and Physical Exam    Patient: Cristi Marshall MRN: [id number]  SSN: [id number]    YOB: 1970  Age: 52 y.o. Sex: female      Subjective:      Chief Complaint: right hip pain    History of Present Illness:  Patient complains of right hip pain and difficulty ambulating, which has progressed over the past several months. X-rays showed osteoarthritis of the joint. The patient's pain has persisted and progressed despite conservative treatments and therapies. The patient has been previously treated with NSAIDs. The patient has at this time opted for surgical intervention. Past Medical History:   Diagnosis Date    Arthritis     Asthma     seasonal    Chronic pain     GERD (gastroesophageal reflux disease)     Osteoarthritis of right hip 8/20/2017     Past Surgical History:   Procedure Laterality Date    HX BREAST LUMPECTOMY  2002    right     HX GYN      ablation     Social History     Occupational History    Not on file. Social History Main Topics    Smoking status: Never Smoker    Smokeless tobacco: Never Used    Alcohol use 4.2 oz/week     7 Cans of beer per week    Drug use: No    Sexual activity: Yes     Partners: Male     Prior to Admission medications    Medication Sig Start Date End Date Taking? Authorizing Provider   diclofenac potassium (CATAFLAM) 50 mg tablet Take 50 mg by mouth as needed. Historical Provider   fexofenadine (ALLEGRA) 180 mg tablet Take 180 mg by mouth daily. Historical Provider   fluticasone (FLONASE) 50 mcg/actuation nasal spray 2 Sprays by Both Nostrils route as needed for Rhinitis. Historical Provider   multivitamin (ONE A DAY) tablet Take 1 Tab by mouth daily. Historical Provider   Cholecalciferol, Vitamin D3, (VITAMIN D3) 2,000 unit cap capsule Take 2,000 Units by mouth daily.     Historical Provider   calcium carbonate (TUMS) 200 mg calcium (500 mg) chew Take 1 Tab by mouth as needed. Indications: HEARTBURN    Historical Provider       Allergies: Allergies   Allergen Reactions    Sulfa (Sulfonamide Antibiotics) Hives        Review of Systems:  Pertinent items are noted in the History of Present Illness. Objective:       Physical Exam:  HEENT: Normocephalic, atraumatic  Lungs:  Clear to auscultation  Heart:   Regular rate and rhythm  Abdomen: Soft  Extremities:  Pain with range of motion of the right hip. Passive flexion  degrees,                       passive internal rotation 0-10 degrees, with pain throughout ROM,                        passive external rotation 10-20 degrees with pain at the arc of motion. Antalgic gait noted. Assessment:      Arthritis of the right hip. Plan:       Proceed with scheduled RIGHT TOTAL HIP ARTHROPLASTY. The various methods of treatment have been discussed with the patient and family. After consideration of risks, benefits, and other options for treatment, the patient has consented to surgical interventions. Questions were answered and preoperative teaching was done by Dr Pj Babcock.      Signed By: ASHOK Hamilton     August 20, 2017

## 2017-08-21 NOTE — IP AVS SNAPSHOT
[address] [place] [address] [address] [address] 
[phone] Patient: Lawernce Epley MRN: [id number] KLD:[id number] You are allergic to the following Allergen Reactions Sulfa (Sulfonamide Antibiotics) Hives Recent Documentation Height Weight BMI OB Status Smoking Status 1.727 m 95.4 kg 31.99 kg/m2 Ablation Never Smoker Emergency Contacts Name Discharge Info Relation Home Work Mobile AttilaJames DISCHARGE CAREGIVER [3] Spouse [3]   [phone] About your hospitalization You were admitted on:  August 21, 2017 You last received care in the:  [facility] [address] You were discharged on:  August 22, 2017 Unit phone number:  [phone] Why you were hospitalized Your primary diagnosis was:  Osteoarthritis Of Right Hip Providers Seen During Your Hospitalizations Provider Role Specialty Primary office phone Cody Pinon MD Attending Provider Orthopedic Surgery [phone] Your Primary Care Physician (PCP) Primary Care Physician Office Phone Office Fax OTHER, PHYS ** None ** ** None ** Follow-up Information Follow up With Details Comments Contact Info Cody Pinon MD On 9/6/2017 Follow up appointment @ 1:00pm [address] [place] [address] Rd [address] [place] [address] 
[phone] Carlyle Gabriel MD   Patient can only remember the practice name and not the physician Current Discharge Medication List  
  
START taking these medications Dose & Instructions Dispensing Information Comments Morning Noon Evening Bedtime  
 aspirin 325 mg tablet Commonly known as:  ASPIRIN Your last dose was: Your next dose is:    
   
   
 Dose:  325 mg Take 1 Tab by mouth two (2) times a day for 21 days. Quantity:  42 Tab Refills:  0  
     
   
   
   
  
 meloxicam 7.5 mg tablet Commonly known as:  MOBIC Your last dose was: Your next dose is:    
   
   
 Dose:  7.5 mg Take 1 Tab by mouth two (2) times a day for 14 days. Quantity:  28 Tab Refills:  0  
     
   
   
   
  
 oxyCODONE-acetaminophen 5-325 mg per tablet Commonly known as:  PERCOCET Your last dose was: Your next dose is: Take 1-2 tablets by mouth every 4-6 hours as needed for pain. Quantity:  60 Tab Refills:  0 CONTINUE these medications which have NOT CHANGED Dose & Instructions Dispensing Information Comments Morning Noon Evening Bedtime  
 calcium carbonate 200 mg calcium (500 mg) Chew Commonly known as:  TUMS Your last dose was: Your next dose is:    
   
   
 Dose:  1 Tab Take 1 Tab by mouth as needed. Indications: HEARTBURN Refills:  0 Cholecalciferol (Vitamin D3) 2,000 unit Cap capsule Commonly known as:  VITAMIN D3 Your last dose was: Your next dose is:    
   
   
 Dose:  2000 Units Take 2,000 Units by mouth daily. Refills:  0  
     
   
   
   
  
 fexofenadine 180 mg tablet Commonly known as:  Elias Alfaro Your last dose was: Your next dose is:    
   
   
 Dose:  180 mg Take 180 mg by mouth daily. Refills:  0  
     
   
   
   
  
 FLONASE 50 mcg/actuation nasal spray Generic drug:  fluticasone Your last dose was: Your next dose is:    
   
   
 Dose:  2 Spray 2 Sprays by Both Nostrils route as needed for Rhinitis. Refills:  0  
     
   
   
   
  
 multivitamin tablet Commonly known as:  ONE A DAY Your last dose was: Your next dose is:    
   
   
 Dose:  1 Tab Take 1 Tab by mouth daily. Refills:  0 STOP taking these medications   
 diclofenac potassium 50 mg tablet Commonly known as:  CATAFLAM  
   
  
  
  
Where to Get Your Medications Information on where to get these meds will be given to you by the nurse or doctor. ! Ask your nurse or doctor about these medications  
  aspirin 325 mg tablet  
 meloxicam 7.5 mg tablet  
 oxyCODONE-acetaminophen 5-325 mg per tablet Discharge Instructions [address] [place] [address] Patient Discharge Instructions Darlene Casarez / [id number] : 1970 Admitted 2017 Discharged: 2017 IF YOU HAVE ANY PROBLEMS ONCE YOU ARE AT HOME CALL THE FOLLOWING NUMBERS:  
Main office number: [phone] Your follow up appointment to see either Dr. Bobby Crowder PA-C, or [facility] as scheduled in 2 weeks. If you are unsure of your appointment date call the office at [phone]. Medication Instructions · Resume your home medictions as directed, you may have directed not to resume supplements until after your follow up. · A prescription for pain medication has been given · It is important that you take the medication exactly as they are prescribed. · Keep your medication in the bottles provided by the pharmacist and keep a list of the medication names, dosages, and times to be taken in your wallet. · Do not take other medications without consulting your doctor. What to do at [facility] Resume your prehospital diet. If you have excessive nausea or vomitting call your doctor's office. Be sure to maintain adequate fluid intake. Some pain medications may cause constipation. Remember to drink fluids, stay as active as possible, and eat plenty of fiber-rich foods. Begin In-Home Physical Therapy; 3 times a week to work on gait training, range of motion, strengthening, and weight bearing exercises as tolerable. Continue to use your walker or cane when walking. May progress from the walker to a cane to complete total bearing as tolerable. Patient may shower. Wrap incision with plastic wrap/covering to prevent incision from getting wet. Avoid complete immersion. YOUR DRESSING SHOULD BE CHANGED BY YOUR HOME HEALTH NURSE 3-5 DAYS AFTER SURGERY. When to Call - Call if you have a temperature greater then 101 
- Unable to keep food down - Are unable to bear any wieght  
- Need a pain medication refill Information obtained by : 
I understand that if any problems occur once I am at home I am to contact my physician. I understand and acknowledge receipt of the instructions indicated above. Physician's or R.N.'s Signature                                                                  Date/Time Patient or Representative Signature                                                          Date/Time Discharge Orders None [employer] Announcement We are excited to announce that we are making your provider's discharge notes available to you in [employer]. You will see these notes when they are completed and signed by the physician that discharged you from your recent hospital stay. If you have any questions or concerns about any information you see in [employer], please call the Health Information Department where you were seen or reach out to your Primary Care Provider for more information about your plan of care. Introducing [facility]! Rosa Braga introduces [employer] patient portal. Now you can access parts of your medical record, email your doctor's office, and request medication refills online. 1. In your internet browser, go to [email or web address] 2. Click on the First Time User? Click Here link in the Sign In box. You will see the New Member Sign Up page. 3. Enter your [employer] Access Code exactly as it appears below. You will not need to use this code after youve completed the sign-up process. If you do not sign up before the expiration date, you must request a new code. · [employer] Access Code: [email or web address] Expires: 10/26/2017 12:11 PM 
 
4. Enter the last four digits of your Social Security Number (xxxx) and Date of Birth (mm/dd/yyyy) as indicated and click Submit. You will be taken to the next sign-up page. 5. Create a [employer] ID. This will be your [employer] login ID and cannot be changed, so think of one that is secure and easy to remember. 6. Create a [employer] password. You can change your password at any time. 7. Enter your Password Reset Question and Answer. This can be used at a later time if you forget your password. 8. Enter your e-mail address. You will receive e-mail notification when new information is available in [address] 9. Click Sign Up. You can now view and download portions of your medical record. 10. Click the Download Summary menu link to download a portable copy of your medical information. If you have questions, please visit the Frequently Asked Questions section of the [employer] website. Remember, [employer] is NOT to be used for urgent needs. For medical emergencies, dial 911. Now available from your iPhone and Android! General Information Please provide this summary of care documentation to your next provider. Patient Signature:  ____________________________________________________________ Date:  ____________________________________________________________  
  
[facility] [place] Provider Signature:  ____________________________________________________________ Date:  ____________________________________________________________

## 2017-08-21 NOTE — PERIOP NOTES
TRANSFER - OUT REPORT:    Verbal report given to shad MIX(name) on Jigna Aiken  being transferred to [address] [place], [place] [address]unit) for routine post - op       Report consisted of patients Situation, Background, Assessment and   Recommendations(SBAR). Information from the following report(s) OR Summary, Procedure Summary and Intake/Output was reviewed with the receiving nurse. Lines:   Peripheral IV 08/21/17 Left; Inner Forearm (Active)   Site Assessment Clean, dry, & intact 8/21/2017  9:26 AM   Phlebitis Assessment 0 8/21/2017  9:26 AM   Infiltration Assessment 0 8/21/2017  9:26 AM   Dressing Status Clean, dry, & intact 8/21/2017  9:26 AM   Dressing Type Transparent;Tape 8/21/2017  9:26 AM   Hub Color/Line Status Infusing 8/21/2017  9:26 AM        Opportunity for questions and clarification was provided.       Patient transported with:   Registered Nurse

## 2017-08-21 NOTE — DISCHARGE SUMMARY
[address] [facility] [address]     DISCHARGE SUMMARY     PATIENT: Christen Rolon     MRN: [id number]   ADMIT DATE: 2017   BILLIN   DISCHARGE DATE:      ATTENDING: Marylin Cabral MD   DICTATING: ASHOK Vega     ADMISSION DIAGNOSIS: UNILATERAL PRIMARY OSTEOARTHRITIS,RIGHT HIP  Osteoarthritis of right hip    DISCHARGE DIAGNOSIS: Status post RIGHT TOTAL HIP ARTHROPLASTY    HISTORY OF PRESENT ILLNESS: The patient is a 52y.o. year-old female   with ongoing right hip pain secondary to osteoarthritis of right hip. The patient's pain has persisted and progressed despite conservative treatments and therapies. The patient has at this time opted for surgical intervention. PAST MEDICAL HISTORY:   Past Medical History:   Diagnosis Date    Arthritis     Asthma     seasonal    Chronic pain     GERD (gastroesophageal reflux disease)     Osteoarthritis of right hip 2017       PAST SURGICAL HISTORY:   Past Surgical History:   Procedure Laterality Date    HX BREAST LUMPECTOMY  2002    right     HX GYN      ablation       ALLERGIES:   Allergies   Allergen Reactions    Sulfa (Sulfonamide Antibiotics) Hives        CURRENT MEDICATIONS:  A list of medications prior to the time of admission include:  Prior to Admission medications    Medication Sig Start Date End Date Taking? Authorizing Provider   aspirin (ASPIRIN) 325 mg tablet Take 1 Tab by mouth two (2) times a day for 21 days. 17 Yes ASHOK Vega   meloxicam (MOBIC) 7.5 mg tablet Take 1 Tab by mouth two (2) times a day for 14 days. 17 Yes ASHOK Vega   oxyCODONE-acetaminophen (PERCOCET) 5-325 mg per tablet Take 1-2 tablets by mouth every 4-6 hours as needed for pain. 17  Yes ASHOK Vega   diclofenac potassium (CATAFLAM) 50 mg tablet Take 50 mg by mouth as needed.    Yes Historical Provider   fexofenadine (ALLEGRA) 180 mg tablet Take 180 mg by mouth daily. Yes Historical Provider   fluticasone (FLONASE) 50 mcg/actuation nasal spray 2 Sprays by Both Nostrils route as needed for Rhinitis. Yes Historical Provider   multivitamin (ONE A DAY) tablet Take 1 Tab by mouth daily. Yes Historical Provider   Cholecalciferol, Vitamin D3, (VITAMIN D3) 2,000 unit cap capsule Take 2,000 Units by mouth daily. Yes Historical Provider   calcium carbonate (TUMS) 200 mg calcium (500 mg) chew Take 1 Tab by mouth as needed. Indications: HEARTBURN   Yes Historical Provider       FAMILY HISTORY: History reviewed. No pertinent family history. SOCIAL HISTORY:   Social History     Social History    Marital status:      Spouse name: N/A    Number of children: N/A    Years of education: N/A     Social History Main Topics    Smoking status: Never Smoker    Smokeless tobacco: Never Used    Alcohol use 4.2 oz/week     7 Cans of beer per week    Drug use: No    Sexual activity: Yes     Partners: Male     Other Topics Concern    None     Social History Narrative       REVIEW OF SYSTEMS: All review of systems are negative. PHYSICAL EXAMINATION: For a detailed physical exam, please refer to the patient's chart. HOSPITAL COURSE: The patient was taken to surgery the day of admission. she underwent right total hip replacement via the anterior approach. Operative course was benign. Estimated blood loss approximately 300 cc. The patient was taken to the PACU in stable condition and was later taken to the floor in stable condition. Post-op Day #1, patient has done very well.  she has had little to no pain. she had been cleared by physical therapy with stair training. she was placed on Aspirin for DVT prophylaxis. her vitals have remained stable. she has also remained hemodynamically stable. The patient has been recommended for discharge home. DISCHARGE INSTRUCTIONS: The patient is to be discharged home.  she is to continue on her prior medications per the medication reconciliation form, to which we will add:         1)  Aspirin 325 mg; 1 tablet p.o. b.i.d. X 21 days         2)  Mobic 7.5 mg; 1 tablet p.o. BID x 14 days           3)  Percocet 5/325 mg; 1-2 tablets p.o. every 4 to 6 hours p.r.n. for pain    The patient is to continue at home with home physical therapy 3 times a week to work on gait training, range of motion, strengthening, and weightbearing exercises as tolerated on her right lower extremity. The patient is to progress from a walker to a cane to complete total weightbearing as tolerable. The patient is to continue to keep her incision dry. The patient is to followup with Dr. Ranjeet Alvarado, Anayeli Nunez PA-C, and/or [facility] in the office approximately 10-14 days status post for x-rays and further evaluation.       Geni Taylor  8/21/2017

## 2017-08-21 NOTE — ANESTHESIA POSTPROCEDURE EVALUATION
Post-Anesthesia Evaluation & Assessment    Visit Vitals    /81    Pulse 67    Temp 36.3 °C (97.3 °F)    Resp 12    Ht 5' 8\" (1.727 m)    Wt 95.4 kg (210 lb 6.4 oz)    SpO2 100%    BMI 31.99 kg/m2       No untreated/active PONV    Post-operative hydration adequate. Adequate post-operative analgesia per PACU discharge criteria    Mental status & level of consciousness: alert and oriented x 3    Respiratory status: patent unassisted airway     No apparent anesthetic complications requiring additional post-anesthetic care    Patient has met all discharge requirements.             Vonda Laguerre MD

## 2017-08-21 NOTE — ROUTINE PROCESS
TRANSFER - IN REPORT:    Verbal report received from Sushil PALACIOSRn(name) on [address]  being received from PACU(unit) for routine post - op      Report consisted of patients Situation, Background, Assessment and   Recommendations(SBAR). Information from the following report(s) SBAR, Kardex, Intake/Output, MAR and Accordion was reviewed with the receiving nurse. Opportunity for questions and clarification was provided.

## 2017-08-22 ENCOUNTER — HOME HEALTH ADMISSION (OUTPATIENT)
Dept: HOME HEALTH SERVICES | Facility: HOME HEALTH | Age: 47
End: 2017-08-22
Payer: COMMERCIAL

## 2017-08-22 VITALS
RESPIRATION RATE: 18 BRPM | WEIGHT: 210.4 LBS | OXYGEN SATURATION: 100 % | HEIGHT: 68 IN | HEART RATE: 84 BPM | TEMPERATURE: 99 F | BODY MASS INDEX: 31.89 KG/M2 | DIASTOLIC BLOOD PRESSURE: 60 MMHG | SYSTOLIC BLOOD PRESSURE: 112 MMHG

## 2017-08-22 LAB
ANION GAP SERPL CALC-SCNC: 7 MMOL/L (ref 3–18)
BUN SERPL-MCNC: 9 MG/DL (ref 7–18)
BUN/CREAT SERPL: 11 (ref 12–20)
CALCIUM SERPL-MCNC: 7.4 MG/DL (ref 8.5–10.1)
CHLORIDE SERPL-SCNC: 102 MMOL/L (ref 100–108)
CO2 SERPL-SCNC: 24 MMOL/L (ref 21–32)
CREAT SERPL-MCNC: 0.8 MG/DL (ref 0.6–1.3)
ERYTHROCYTE [DISTWIDTH] IN BLOOD BY AUTOMATED COUNT: 12.9 % (ref 11.6–14.5)
GLUCOSE SERPL-MCNC: 106 MG/DL (ref 74–99)
HCT VFR BLD AUTO: 25.1 % (ref 35–45)
HGB BLD-MCNC: 8.7 G/DL (ref 12–16)
MCH RBC QN AUTO: 29.7 PG (ref 24–34)
MCHC RBC AUTO-ENTMCNC: 34.7 G/DL (ref 31–37)
MCV RBC AUTO: 85.7 FL (ref 74–97)
PLATELET # BLD AUTO: 164 K/UL (ref 135–420)
PMV BLD AUTO: 10.9 FL (ref 9.2–11.8)
POTASSIUM SERPL-SCNC: 3.8 MMOL/L (ref 3.5–5.5)
RBC # BLD AUTO: 2.93 M/UL (ref 4.2–5.3)
SODIUM SERPL-SCNC: 133 MMOL/L (ref 136–145)
WBC # BLD AUTO: 12.4 K/UL (ref 4.6–13.2)

## 2017-08-22 PROCEDURE — 97530 THERAPEUTIC ACTIVITIES: CPT

## 2017-08-22 PROCEDURE — 80048 BASIC METABOLIC PNL TOTAL CA: CPT | Performed by: PHYSICIAN ASSISTANT

## 2017-08-22 PROCEDURE — 97110 THERAPEUTIC EXERCISES: CPT

## 2017-08-22 PROCEDURE — 97116 GAIT TRAINING THERAPY: CPT

## 2017-08-22 PROCEDURE — 74011250637 HC RX REV CODE- 250/637: Performed by: PHYSICIAN ASSISTANT

## 2017-08-22 PROCEDURE — 74011250636 HC RX REV CODE- 250/636: Performed by: PHYSICIAN ASSISTANT

## 2017-08-22 PROCEDURE — 85027 COMPLETE CBC AUTOMATED: CPT | Performed by: PHYSICIAN ASSISTANT

## 2017-08-22 PROCEDURE — 36415 COLL VENOUS BLD VENIPUNCTURE: CPT | Performed by: PHYSICIAN ASSISTANT

## 2017-08-22 RX ADMIN — PANTOPRAZOLE SODIUM 40 MG: 40 TABLET, DELAYED RELEASE ORAL at 08:01

## 2017-08-22 RX ADMIN — MULTIPLE VITAMINS W/ MINERALS TAB 1 TABLET: TAB at 08:01

## 2017-08-22 RX ADMIN — DOCUSATE SODIUM 100 MG: 100 CAPSULE, LIQUID FILLED ORAL at 08:01

## 2017-08-22 RX ADMIN — KETOROLAC TROMETHAMINE 15 MG: 15 INJECTION, SOLUTION INTRAMUSCULAR; INTRAVENOUS at 08:01

## 2017-08-22 RX ADMIN — ASPIRIN 325 MG: 325 TABLET, FILM COATED ORAL at 08:01

## 2017-08-22 RX ADMIN — KETOROLAC TROMETHAMINE 15 MG: 15 INJECTION, SOLUTION INTRAMUSCULAR; INTRAVENOUS at 03:11

## 2017-08-22 RX ADMIN — VITAMIN D, TAB 1000IU (100/BT) 2000 UNITS: 25 TAB at 08:01

## 2017-08-22 RX ADMIN — LORATADINE 10 MG: 10 TABLET ORAL at 08:01

## 2017-08-22 RX ADMIN — ACETAMINOPHEN 650 MG: 325 TABLET ORAL at 01:39

## 2017-08-22 RX ADMIN — MELOXICAM 7.5 MG: 7.5 TABLET ORAL at 08:01

## 2017-08-22 RX ADMIN — FERROUS SULFATE TAB 325 MG (65 MG ELEMENTAL FE) 325 MG: 325 (65 FE) TAB at 08:01

## 2017-08-22 RX ADMIN — ACETAMINOPHEN 650 MG: 325 TABLET ORAL at 08:01

## 2017-08-22 NOTE — PROGRESS NOTES
0710 Assumed care of pt at this time. Pt in chair with no signs of distress. Pt left with call light within reach and encouraged to call for assistance. 0800 Head to toe assessment completed at this time  Patient is A&OX4. Pt denies N/V chest pain and SOB or distress. Pt is calm and cooperative. Pedal pulses are present. Capillary refill less than 3 seconds. Skin in warm and dry with mepilex dressing on RT hip and is CDI. Lungs are clear bilaterally. Patient instructed on use and reason for incentive spirometer. Bowel sounds are active. Abdomen is soft and non-tender. HOMAR & SCDS in place bilaterally . Positive dorsalis pedis pulse, sensation, warm. No tingling or numbness to lower extremities. 18 g needle in the LFA. Pain scale explained to patient. Reasons for taking PRN meds explained to patient. Patient instructed to call for prn when needed. Pain level is 1. Patient was oriented to call bell and bed function.  Will continue to monitor

## 2017-08-22 NOTE — PROGRESS NOTES
1950: Assessment completed and documented. Patient alert and oriented x 4, incision to right hip. Mepilex silver dressing clean, dry and intact. Pain 3/10 to right hip on a 0-10/10 numeric scale. Ice packs to site. Patient denies numbness or tingling to bilateral lower and upper extremities. No nausea, no SOB, no distress. Patient educated on IS, and \"up for dinner program\", patient verbalized understanding. 2200: patient assisted out of bed to the bathroom, pain 3/10, declined pain meds. 2249: patient became nauseous, emesis 50 ml clear liquid. Zofran given per [facility].     0881: Another emesis episode, patient stated feeling better after it. 0137: Patient denies any nausea, pain 2/10, Scheduled Tylenol given per MAR. Declined oxycodone at this time. 3312: Patient resting in bed, no distress. 8999: patient assisted out of bed to the bathroom. 6509: patient assisted to chair. Pain 2/10, patient declined pain meds. Ice pack to site.

## 2017-08-22 NOTE — PROGRESS NOTES
Problem: Falls - Risk of  Goal: *Absence of Falls  Document Anatoliy Fall Risk and appropriate interventions in the flowsheet. Outcome: Progressing Towards Goal  Fall Risk Interventions:  Mobility Interventions: Utilize walker, cane, or other assitive device, PT Consult for assist device competence, OT consult for ADLs, Patient to call before getting OOB, Communicate number of staff needed for ambulation/transfer, Assess mobility with egress test     Mentation Interventions: Adequate sleep, hydration, pain control, Increase mobility, Toileting rounds, Update white board     Medication Interventions: Patient to call before getting OOB, Teach patient to arise slowly     Elimination Interventions:  Toileting schedule/hourly rounds, Toilet paper/wipes in reach, Patient to call for help with toileting needs, Call light in reach

## 2017-08-22 NOTE — PROGRESS NOTES
Progress Note        Patient: Cristi Marshall MRN: [id number]  SSN: [id number]    YOB: 1970  Age: 52 y.o. Sex: female      1 Day Post-Op status post Procedure(s) (LRB):  RIGHT TOTAL HIP REPLACEMENT ANTERIOR APPROACH W/C-ARM (Right)    Admit Date: 2017  Admit Diagnosis: UNILATERAL PRIMARY OSTEOARTHRITIS,RIGHT HIP  Osteoarthritis of right hip    Subjective:      Doing well. No complaints. No SOB. No Chest Pain. No Nausea or Vomiting. No problems eating or voiding. Objective:        Temp (24hrs), Av.7 °F (36.5 °C), Min:97.3 °F (36.3 °C), Max:98.7 °F (37.1 °C)    Body mass index is 31.99 kg/(m^2). Patient Vitals for the past 12 hrs:   BP Temp Pulse Resp SpO2   17 0306 105/52 98.3 °F (36.8 °C) 73 15 97 %   17 2317 107/66 98.7 °F (37.1 °C) 71 15 100 %   17 1935 115/70 97.6 °F (36.4 °C) 77 15 100 %     Recent Labs      17   0112   HGB  8.7*   HCT  25.1*   NA  133*   K  3.8   CL  102   CO2  24   BUN  9   CREA  0.80   GLU  106*       Physical Exam:  Vital Signs are Stable. No Acute Distress. Alert and Oriented. Negative Homans sign. Toes AROM Full. Neurovascular exam is normal.    Dressing is Clean, Dry, and Intact. Assessment/Plan:     Stable s/p thr  oob with rehab  1.  D/c planning    Continue PT/OT  Discharge Plan: Home    Signed By: Gayle Murray MD     2017

## 2017-08-22 NOTE — PROGRESS NOTES
Met with pt and spouse at bedside. Pt plans discharge home, spouse able to assist. [address] offered and pt chose [facility] [phone] for follow up; referral placed with CMS and liaison aware. Pt has RW at bedside, delivered by [facility].    Care Management Interventions  PCP Verified by CM:  Yes  Transition of Care Consult (CM Consult): [address]: Yes  Discharge Durable Medical Equipment: Yes  Physical Therapy Consult: Yes  Occupational Therapy Consult: Yes  Current Support Network: Lives with Spouse, Own Home  Confirm Follow Up Transport: Family  Plan discussed with Pt/Family/Caregiver: Yes  Freedom of Choice Offered: Yes  Discharge Location  Discharge Placement: Home with home health

## 2017-08-22 NOTE — ROUTINE PROCESS
Bedside and Verbal shift change report given to CAILIN Santos (oncoming nurse) by Monica Gustafson RN (offgoing nurse). Report included the following information SBAR, Kardex and MAR.

## 2017-08-22 NOTE — PROGRESS NOTES
Problem: Mobility Impaired (Adult and Pediatric)  Goal: *Acute Goals and Plan of Care (Insert Text)  In 1-7 days pt will be able to perform:  ST. Bed mobility: Rolling L to R to L modified independent for positioning. 2. Supine to sit to supine S with HR for meals. 3. Sit to stand to sit S with RW in prep for ambulation. LT. Gait: Ambulate >150ft S with RW, WBAT, for home/community mobility. 2. Stair Negotiation: Ascend/descend >3 steps CGA with HR for home entry. 3. Activity tolerance: Tolerate up in chair 1-2 hours for ADLs. 4. Patient/Family Education: Patient/family to be independent with HEP for follow-up care and safe discharge. Outcome: Resolved/Met Date Met:  17  PHYSICAL THERAPY TREATMENT/DISCHARGE     Patient: Mansi Christy (71 y.o. female)  Date: 2017  Diagnosis: UNILATERAL PRIMARY OSTEOARTHRITIS,RIGHT HIP  Osteoarthritis of right hip Osteoarthritis of right hip  Procedure(s) (LRB):  RIGHT TOTAL HIP REPLACEMENT ANTERIOR APPROACH W/C-ARM (Right) 1 Day Post-Op  Precautions: Fall, WBAT  Chart, physical therapy assessment, plan of care and goals were reviewed. ASSESSMENT:  Pt seen in bed prior to session, w/ B/L SCDs and spouse present in the room. Pt reported 1-2/10 pain in R hip prior to treatment session. Pt has met all goals at this time. Pt able to ambulate 220 ft w/ RW but demonstrates antalgic step to gait, decrease kb, decrease stride length, decrease heel strike, and decrease push off for proper step clearance, however pt does not demonstrate any signs of LOB. Pt was able to perform step negotiation using the R HR and side step technique w/o any signs of LOB. Pt was transferred back to room where pt was able to perform therex activity w/ min VCing but has difficulty performing SLR secondary to discomfort and pain. Pt requested to don clothes prior to exiting, pt was able to don clothes independently and no difficulty.  Pt was left in bed after treatment, call bell, and tray, spouse present in the room. Pt has met all goals at this time, DC from acute PT. Progression toward goals:  [X]      Goals met  [ ]      Improving appropriately and progressing toward goals  [ ]      Improving slowly and progressing toward goals  [ ]      Not making progress toward goals and plan of care will be adjusted       PLAN:  Patient will be discharged from physical therapy at this time. Rationale for discharge:  [X] Goals Achieved  [ ] Burt Mason  [ ] Patient not participating in therapy  [ ] Other:  Discharge Recommendations:  Home Health  Further Equipment Recommendations for Discharge:  rolling walker       SUBJECTIVE:   Patient stated Can I pee first.      OBJECTIVE DATA SUMMARY:   Critical Behavior:  Neurologic State: Alert, Appropriate for age  Orientation Level: Oriented X4  Cognition: Appropriate decision making, Appropriate for age attention/concentration, Appropriate safety awareness, Follows commands  Safety/Judgement: Awareness of environment  Functional Mobility Training:  Bed Mobility:  Supine to Sit: Modified independent  Sit to Supine: Modified independent  Scooting: Modified independent  Transfers:  Sit to Stand: Modified independent;Supervision  Stand to Sit: Modified independent;Supervision  Balance:  Sitting: Intact  Standing: Intact; With support  Ambulation/Gait Training:  Distance (ft): 220 Feet (ft)  Assistive Device: Gait belt;Walker, rolling  Ambulation - Level of Assistance: Modified independent  Gait Abnormalities: Antalgic;Decreased step clearance; Step to gait  Right Side Weight Bearing: As tolerated  Base of Support: Shift to left  Stance: Right decreased  Speed/Parris: Slow  Step Length: Left shortened;Right shortened  Swing Pattern: Left asymmetrical;Right asymmetrical  Interventions: Safety awareness training; Tactile cues; Verbal cues  Stairs:  Number of Stairs Trained: 10  Stairs - Level of Assistance: Modified independent;Supervision Rail Use: Right   Therapeutic Exercises:         EXERCISE   Sets   Reps   Active Active Assist   Passive Self ROM   Comments   Ankle Pumps 1 10  [X] [ ] [ ] [ ]     Quad Sets/Glut Sets     [ ] [ ] [ ] [ ]     Hamstring Sets     [ ] [ ] [ ] [ ]     Short Arc Quads 2 10 [X] [ ] [ ] [ ]     Kadie Herrona     [ ] [ ] [ ] [ ]     Straight Leg Raises 2 10/5 [X] [ ] [ ] [ ] R LE 5 only   Hip Add (pillow squeeze for 5 secs) 1 10 [X] [ ] [ ] [ ]     Dooly Conch 2 10 [X] [ ] [ ] [ ]     Seated Marching 1 20 [X] [ ] [ ] [ ]     Rimma Moulding     [ ] [ ] [ ] [ ]           [ ] [ ] [ ] [ ]        Pain:  Pain Scale 1: Numeric (0 - 10)  Pain Intensity 1: 1  Pain Location 1: Hip  Pain Orientation 1: Right  Pain Description 1: Aching  Pain Intervention(s) 1: Declines;Cold pack  Activity Tolerance:   Good  Please refer to the flowsheet for vital signs taken during this treatment.   After treatment:   [ ] Patient left in no apparent distress sitting up in chair  [X] Patient left in no apparent distress in bed  [X] Call bell left within reach  [X] Nursing notified  [X] Caregiver present  [ ] Bed alarm activated  Leann Wheeler, PT   Time Calculation: 44 mins

## 2017-08-22 NOTE — DISCHARGE INSTRUCTIONS
[address] [facility] [address]   Patient Discharge Instructions    Fallon Garcia / [id number] : 1970    Admitted 2017 Discharged: 2017     IF YOU HAVE ANY PROBLEMS ONCE YOU ARE AT HOME CALL THE FOLLOWING NUMBERS:   Main office number: [phone]    Your follow up appointment to see either Dr. Abbie Pineda, [facility] YOLI, or [facility] PAEDWIGE as scheduled in 2 weeks. If you are unsure of your appointment date call the office at [phone]. Medication Instructions     · Resume your home medictions as directed, you may have directed not to resume supplements until after your follow up. · A prescription for pain medication has been given   · It is important that you take the medication exactly as they are prescribed. · Keep your medication in the bottles provided by the pharmacist and keep a list of the medication names, dosages, and times to be taken in your wallet. · Do not take other medications without consulting your doctor. What to do at [address] [place] [address] Ave your prehospital diet. If you have excessive nausea or vomitting call your doctor's office. Be sure to maintain adequate fluid intake. Some pain medications may cause constipation. Remember to drink fluids, stay as active as possible, and eat plenty of fiber-rich foods. Begin In-Home Physical Therapy; 3 times a week to work on gait training, range of motion, strengthening, and weight bearing exercises as tolerable. Continue to use your walker or cane when walking. May progress from the walker to a cane to complete total bearing as tolerable. Patient may shower. Wrap incision with plastic wrap/covering to prevent incision from getting wet. Avoid complete immersion. YOUR DRESSING SHOULD BE CHANGED BY YOUR HOME HEALTH NURSE 3-5 DAYS AFTER SURGERY.           When to Call    - Call if you have a temperature greater then 101  - Unable to keep food down  - Are unable to bear any wieght   - Need a pain medication refill     Information obtained by :  I understand that if any problems occur once I am at home I am to contact my physician. I understand and acknowledge receipt of the instructions indicated above.                                                                                                                                            Physician's or R.N.'s Signature                                                                  Date/Time                                                                                                                                              Patient or Representative Signature                                                          Date/Time

## 2017-08-22 NOTE — ROUTINE PROCESS
Dual AVS reviewed with Silvia Cano RN. All medications reviewed individually with patient. Opportunities for questions and concerns provided. Patient discharged via (mode of transport ie. Car, ambulance or air transport) car. Patient's arm band appropriately discarded.

## 2017-08-23 ENCOUNTER — HOME CARE VISIT (OUTPATIENT)
Dept: SCHEDULING | Facility: HOME HEALTH | Age: 47
End: 2017-08-23
Payer: COMMERCIAL

## 2017-08-23 VITALS
DIASTOLIC BLOOD PRESSURE: 66 MMHG | OXYGEN SATURATION: 99 % | HEART RATE: 88 BPM | TEMPERATURE: 97.8 F | SYSTOLIC BLOOD PRESSURE: 118 MMHG

## 2017-08-23 VITALS — OXYGEN SATURATION: 98 % | HEART RATE: 88 BPM | SYSTOLIC BLOOD PRESSURE: 116 MMHG | DIASTOLIC BLOOD PRESSURE: 72 MMHG

## 2017-08-23 PROCEDURE — 400013 HH SOC

## 2017-08-23 PROCEDURE — G0299 HHS/HOSPICE OF RN EA 15 MIN: HCPCS

## 2017-08-23 PROCEDURE — G0151 HHCP-SERV OF PT,EA 15 MIN: HCPCS

## 2017-08-24 ENCOUNTER — HOME CARE VISIT (OUTPATIENT)
Dept: HOME HEALTH SERVICES | Facility: HOME HEALTH | Age: 47
End: 2017-08-24
Payer: COMMERCIAL

## 2017-08-26 ENCOUNTER — HOME CARE VISIT (OUTPATIENT)
Dept: SCHEDULING | Facility: HOME HEALTH | Age: 47
End: 2017-08-26
Payer: COMMERCIAL

## 2017-08-26 ENCOUNTER — HOME CARE VISIT (OUTPATIENT)
Dept: HOME HEALTH SERVICES | Facility: HOME HEALTH | Age: 47
End: 2017-08-26
Payer: COMMERCIAL

## 2017-08-26 PROCEDURE — G0299 HHS/HOSPICE OF RN EA 15 MIN: HCPCS

## 2017-08-26 PROCEDURE — G0157 HHC PT ASSISTANT EA 15: HCPCS

## 2017-08-27 VITALS — DIASTOLIC BLOOD PRESSURE: 93 MMHG | HEART RATE: 97 BPM | SYSTOLIC BLOOD PRESSURE: 130 MMHG

## 2017-08-28 ENCOUNTER — HOME CARE VISIT (OUTPATIENT)
Dept: SCHEDULING | Facility: HOME HEALTH | Age: 47
End: 2017-08-28
Payer: COMMERCIAL

## 2017-08-28 PROCEDURE — G0157 HHC PT ASSISTANT EA 15: HCPCS

## 2017-08-29 VITALS
SYSTOLIC BLOOD PRESSURE: 110 MMHG | HEART RATE: 80 BPM | TEMPERATURE: 97.8 F | RESPIRATION RATE: 18 BRPM | OXYGEN SATURATION: 98 % | DIASTOLIC BLOOD PRESSURE: 80 MMHG

## 2017-08-30 ENCOUNTER — HOME CARE VISIT (OUTPATIENT)
Dept: SCHEDULING | Facility: HOME HEALTH | Age: 47
End: 2017-08-30
Payer: COMMERCIAL

## 2017-08-30 VITALS — HEART RATE: 84 BPM | DIASTOLIC BLOOD PRESSURE: 82 MMHG | OXYGEN SATURATION: 98 % | SYSTOLIC BLOOD PRESSURE: 120 MMHG

## 2017-08-30 PROCEDURE — G0157 HHC PT ASSISTANT EA 15: HCPCS

## 2017-08-31 ENCOUNTER — HOME CARE VISIT (OUTPATIENT)
Dept: SCHEDULING | Facility: HOME HEALTH | Age: 47
End: 2017-08-31
Payer: COMMERCIAL

## 2017-08-31 VITALS — SYSTOLIC BLOOD PRESSURE: 106 MMHG | HEART RATE: 77 BPM | DIASTOLIC BLOOD PRESSURE: 80 MMHG | OXYGEN SATURATION: 99 %

## 2017-08-31 VITALS
OXYGEN SATURATION: 99 % | RESPIRATION RATE: 16 BRPM | TEMPERATURE: 97.8 F | DIASTOLIC BLOOD PRESSURE: 82 MMHG | SYSTOLIC BLOOD PRESSURE: 140 MMHG | HEART RATE: 76 BPM

## 2017-08-31 PROCEDURE — G0299 HHS/HOSPICE OF RN EA 15 MIN: HCPCS

## 2017-09-01 ENCOUNTER — HOME CARE VISIT (OUTPATIENT)
Dept: SCHEDULING | Facility: HOME HEALTH | Age: 47
End: 2017-09-01
Payer: COMMERCIAL

## 2017-09-01 PROCEDURE — G0157 HHC PT ASSISTANT EA 15: HCPCS

## 2017-09-03 VITALS — HEART RATE: 86 BPM | SYSTOLIC BLOOD PRESSURE: 118 MMHG | DIASTOLIC BLOOD PRESSURE: 70 MMHG | OXYGEN SATURATION: 97 %

## 2017-09-04 ENCOUNTER — HOME CARE VISIT (OUTPATIENT)
Dept: SCHEDULING | Facility: HOME HEALTH | Age: 47
End: 2017-09-04
Payer: COMMERCIAL

## 2017-09-04 VITALS
TEMPERATURE: 97 F | DIASTOLIC BLOOD PRESSURE: 78 MMHG | OXYGEN SATURATION: 99 % | HEART RATE: 67 BPM | RESPIRATION RATE: 16 BRPM | SYSTOLIC BLOOD PRESSURE: 128 MMHG

## 2017-09-04 PROCEDURE — G0299 HHS/HOSPICE OF RN EA 15 MIN: HCPCS

## 2017-09-05 ENCOUNTER — HOME CARE VISIT (OUTPATIENT)
Dept: SCHEDULING | Facility: HOME HEALTH | Age: 47
End: 2017-09-05
Payer: COMMERCIAL

## 2017-09-05 PROCEDURE — G0157 HHC PT ASSISTANT EA 15: HCPCS

## 2017-09-06 ENCOUNTER — HOME CARE VISIT (OUTPATIENT)
Dept: SCHEDULING | Facility: HOME HEALTH | Age: 47
End: 2017-09-06
Payer: COMMERCIAL

## 2017-09-06 ENCOUNTER — HOME CARE VISIT (OUTPATIENT)
Dept: HOME HEALTH SERVICES | Facility: HOME HEALTH | Age: 47
End: 2017-09-06
Payer: COMMERCIAL

## 2017-09-06 PROCEDURE — G0157 HHC PT ASSISTANT EA 15: HCPCS

## 2017-09-12 ENCOUNTER — HOSPITAL ENCOUNTER (OUTPATIENT)
Dept: PHYSICAL THERAPY | Age: 47
Discharge: HOME | End: 2017-09-12
Payer: COMMERCIAL

## 2017-09-12 PROCEDURE — 97110 THERAPEUTIC EXERCISES: CPT

## 2017-09-12 PROCEDURE — 97161 PT EVAL LOW COMPLEX 20 MIN: CPT

## 2017-09-12 NOTE — PROGRESS NOTES
PHYSICAL THERAPY - DAILY TREATMENT NOTE    Patient Name: Royal Danielle        Date: 2017  : 1970   YES Patient  Verified  Visit #:     Insurance: Payor: Karol Braxton / Plan: [facility] PPO / Product Type: PPO /      In time: 4 Out time: 4:45   Total Treatment Time: 45     Medicare Time Tracking (below)   Total Timed Codes (min):  - 1:1 Treatment Time:  -     TREATMENT AREA =  R THR    SUBJECTIVE    Pain Level (on 0 to 10 scale):  3  / 10   Medication Changes/New allergies or changes in medical history, any new surgeries or procedures? NO    If yes, update Summary List   Subjective Functional Status/Changes:  []  No changes reported     See eval          OBJECTIVE    Modality rationale:  decrease pain/edema     min [] Estim, type:                                          []  att     []  unatt     []  w/US     []  w/ice    []  w/heat    min []  Mechanical Traction: type/lbs                                               []  pro   []  sup   []  int   []  cont    min []  Ultrasound, settings/location:      min []  Iontophoresis:  []  take home patch w/ dexamethazone    min                                []  in clinic w/ dexamethazone   10 min [x]  Ice     []  Heat     position:     min []  Other:      10 min Therapeutic Exercise:  [x]  See flow sheet   []  Other:      []  Added:     to improve (function):    []  Changed:     to improve (function):       min Therapeutic Activity:      5 min Manual Therapy:       min Gait Training:       min Patient Education:  YES  Reviewed HEP   []  Progressed/Changed HEP based on:         Other Objective/Functional Measures:    See eval     Post Treatment Pain Level (on 0 to 10) scale:    10     ASSESSMENT    []  See Progress Note/Recertification   Patient will continue to benefit from skilled therapy to address remaining functional deficits: see eval   Progress toward goals / Updated goals:    -     PLAN    [x]  Upgrade activities as tolerated YES Continue plan of care   []  Discharge due to :    []  Other:      Therapist: Garry Yarbrough PT    Date: 9/12/2017 Time: 3:46 PM

## 2017-09-12 NOTE — PROGRESS NOTES
[address]   PHYSICAL THERAPY AT [facility] [address] [place], [address] - Phone: [phone]  Fax: [phone] / [address] [facility]  Patient Name: Ute Cartagena : 1970   Medical   Diagnosis: History of total right hip replacement [Z96.641] Treatment Diagnosis: R THR   Onset Date:      Referral Source: Khurram Eller MD [facility]): 2017   Prior Hospitalization: See medical history Provider #: [id number]   Prior Level of Function: Chronic R hip pain with problems   Comorbidities: OA   Medications: Verified on Patient Summary List   The Plan of Care and following information is based on the information from the initial evaluation.   ==============================================================================  Assessment / johnston information:   Ute Cartagena is a 52 y.o. female with a chief c/o R hip pain, up to 4/10, following R THR, on 17. She has limited R hip AROM/PROM with 93/98 degrees of flex, 0/5 degrees of ext, 30/40 degrees of abd, 24/32 degrees of ER and 20/30 degrees of IR. She is ambulating with mild antalgia with a st cane, lacking R hip extension. She lacks proper HS length L and R and R quad length (with < 90 degrees of prone knee flexion PROM). Her B hip flexors lack proper length as well, with + B Barrera tests. Her SLS is poor, with < 5 sec L and < 2 sec R.  Her incision has no sings of infection, but has signs of edema and hematoma.   The patient would benefit from skilled physical therapy at this time.  ===========================================================================================  Eval Complexity: History LOW Complexity : Zero comorbidities / personal factors that will impact the outcome / POC;  Examination  LOW Complexity : 1-2 Standardized tests and measures addressing body structure, function, activity limitation and / or participation in recreation ; Presentation LOW Complexity : Stable, uncomplicated ;  Decision Making MEDIUM Complexity : FOTO score of 26-74; Overall Complexity LOW   Problem List: pain affecting function, decrease ROM, decrease strength, edema affecting function, impaired gait/ balance, decrease ADL/ functional abilitiies, decrease activity tolerance and decrease flexibility/ joint mobility   Treatment Plan may include any combination of the following: Therapeutic exercise, Therapeutic activities, Neuromuscular re-education, Physical agent/modality, Gait/balance training, Manual therapy and Patient education  Patient / Family readiness to learn indicated by: asking questions, trying to perform skills and interest  Persons(s) to be included in education: patient (P)  Barriers to Learning/Limitations: None  Measures taken:    Patient Goal (s): \"get back to walking on the beach\"   Patient self reported health status: excellent  Rehabilitation Potential: good   Short Term Goals: To be accomplished in  2  weeks:  1. Decrease mac pain to < 2/10    Long Term Goals: To be accomplished in  4-5  weeks:  1. All ADL's without R hip pain  2. Normal gait without AD or deviation, for > 1/2 mile without pain  3. Increase active R hip ER to > 35 degrees to indicate increased strength  4.  > 15 degrees of passive R hip ext to allow normal gait  Frequency / Duration:   Patient to be seen 2-3  times per week for 4-8  weeks:  Patient / Caregiver education and instruction: self care, activity modification and exercises  Therapist Signature: Sahra Sanon PT, MDT  Date: 3/83/8139   Certification Period: NA Time: 4:50 PM   ===========================================================================================  I certify that the above Physical Therapy Services are being furnished while the patient is under my care. I agree with the treatment plan and certify that this therapy is necessary.     Physician Signature: Date:       Time:     Please sign and return to [facility] or you may fax the signed copy to [phone]. Thank you.

## 2017-09-14 ENCOUNTER — HOSPITAL ENCOUNTER (OUTPATIENT)
Dept: PHYSICAL THERAPY | Age: 47
Discharge: HOME | End: 2017-09-14
Payer: COMMERCIAL

## 2017-09-14 VITALS — HEART RATE: 83 BPM | OXYGEN SATURATION: 98 % | DIASTOLIC BLOOD PRESSURE: 76 MMHG | SYSTOLIC BLOOD PRESSURE: 112 MMHG

## 2017-09-14 PROCEDURE — 97110 THERAPEUTIC EXERCISES: CPT

## 2017-09-14 PROCEDURE — 97140 MANUAL THERAPY 1/> REGIONS: CPT

## 2017-09-14 NOTE — PROGRESS NOTES
PHYSICAL THERAPY - DAILY TREATMENT NOTE    Patient Name: Yung Coulter        Date: 2017  : 1970    Patient  Verified: YES  Visit #:   2   of   12  Insurance: Payor: BLUE CROSS / Plan: [facility] PPO / Product Type: PPO /      In time: 3:40 Out time: 4:25   Total Treatment Time: 45     Medicare Time Tracking (below)   Total Timed Codes (min):  - 1:1 Treatment Time:  -     TREATMENT AREA/ DIAGNOSIS = History of total right hip replacement [Z96.641]    SUBJECTIVE  Pain Level (on 0 to 10 scale):  3  / 10   Medication Changes/New allergies or changes in medical history, any new surgeries or procedures?     NO    If yes, update Summary List   Subjective Functional Status/Changes:  []  No changes reported     Functional improvement  I did your exercises  Functional limitation  im sore from your exercises     OBJECTIVE  Modalities Rationale:     decrease edema, decrease inflammation and decrease pain to improve patient's ability to perform ADLs without pain   min [] Estim, type/location:                                      []  att     []  unatt     []  w/US     []  w/ice    []  w/heat    min []  Mechanical Traction: type/lbs                   []  pro   []  sup   []  int   []  cont    []  before manual    []  after manual    min []  Ultrasound, settings/location:      min []  Iontophoresis w/ dexamethasone, location:                                               []  take home patch       []  in clinic   10 min [x]  Ice     []  Heat    location/position:     min []  Vasopneumatic Device, press/temp:     min []  Other:    [x] Skin assessment post-treatment (if applicable):    [x]  intact    []  redness- no adverse reaction     []redness  adverse reaction:        10 min Manual Therapy: STM/DTM to R proximal hip and quad , PROM R hip , all planes,r PIRIFORMIS REELASE     Rationale:      decrease pain, increase ROM and increase tissue extensibility to improve patient's ability to perform ADLs without pain    25 min Therapeutic Exercise:  [x]  See flow sheet   Rationale:      increase ROM, increase strength, improve coordination, improve balance and increase proprioception to improve the patients ability to perform ADLs without pain          min Patient Education:  Yes    [x] Reviewed HEP   []  Progressed/Changed HEP based on: Other Objective/Functional Measures:    Pt with soreness from HEP   Rigid edema around ant/lateral hip, indicative of hematoma, initiated more extensive strengthening program    Post Treatment Pain Level (on 0 to 10) scale:   4  / 10     ASSESSMENT  Assessment/Changes in Function:     Pt needed VCs for correct therex     []  See Progress Note/Recertification   Patient will continue to benefit from skilled PT services to modify and progress therapeutic interventions, address functional mobility deficits, address ROM deficits, address strength deficits, analyze and address soft tissue restrictions, analyze and cue movement patterns, analyze and modify body mechanics/ergonomics and assess and modify postural abnormalities to attain remaining goals.    Progress toward goals / Updated goals:    Pt with mild increase in pain with PT today     PLAN  [x]  Upgrade activities as tolerated YES Continue plan of care   []  Discharge due to :    []  Other:      Therapist: Loreatha Nageotte, PT    Date: 9/14/2017 Time: 4:03 PM        Future Appointments  Date Time Provider Shahbaz Quintero   9/19/2017 4:00 PM Loreatha Nageotte, PT [facility]   9/21/2017 4:00 PM Brooks Laughlin [facility]   9/26/2017 4:00 PM Brooks Laughlin [facility]   9/28/2017 4:00 PM Brooks Laughlin [facility]   10/3/2017 3:30 PM Loreatha Nageotte, PT [facility]   10/5/2017 3:30 PM Juanpablo Page [facility]   10/10/2017 3:30 PM Juanpablo Page [facility]   10/12/2017 3:30 PM Loreatha Nageotte, PT [facility]

## 2017-09-19 ENCOUNTER — HOSPITAL ENCOUNTER (OUTPATIENT)
Dept: PHYSICAL THERAPY | Age: 47
Discharge: HOME | End: 2017-09-19
Payer: COMMERCIAL

## 2017-09-19 PROCEDURE — 97110 THERAPEUTIC EXERCISES: CPT

## 2017-09-19 PROCEDURE — 97140 MANUAL THERAPY 1/> REGIONS: CPT

## 2017-09-19 NOTE — PROGRESS NOTES
PHYSICAL THERAPY - DAILY TREATMENT NOTE    Patient Name: Jazz Pacheco        Date: 2017  : 1970    Patient  Verified: YES  Visit #:   3   of   12  Insurance: Payor: BLUE CROSS / Plan: [facility] PPO / Product Type: PPO /      In time: 4:05 Out time: 4:55   Total Treatment Time: 50     Medicare Time Tracking (below)   Total Timed Codes (min):  - 1:1 Treatment Time:  -     TREATMENT AREA/ DIAGNOSIS = History of total right hip replacement [Z96.641]    SUBJECTIVE  Pain Level (on 0 to 10 scale):  0  / 10   Medication Changes/New allergies or changes in medical history, any new surgeries or procedures?     NO    If yes, update Summary List   Subjective Functional Status/Changes:  []  No changes reported     Functional improvement  No pain  Functional limitation       OBJECTIVE  Modalities Rationale:     decrease edema, decrease inflammation and decrease pain to improve patient's ability to perform ADLs without pain   min [] Estim, type/location:                                      []  att     []  unatt     []  w/US     []  w/ice    []  w/heat    min []  Mechanical Traction: type/lbs                   []  pro   []  sup   []  int   []  cont    []  before manual    []  after manual    min []  Ultrasound, settings/location:      min []  Iontophoresis w/ dexamethasone, location:                                               []  take home patch       []  in clinic   10 min [x]  Ice     []  Heat    location/position:     min []  Vasopneumatic Device, press/temp:     min []  Other:    [x] Skin assessment post-treatment (if applicable):    [x]  intact    [x]  redness- no adverse reaction     []redness  adverse reaction:        10 min Manual Therapy: STM to RE proximal quad/lateral hip, R hip PROM   Rationale:      decrease pain, increase ROM and increase tissue extensibility to improve patient's ability to perform ADLs without pain    30 min Therapeutic Exercise:  [x]  See flow sheet Rationale:      increase ROM, increase strength and improve balance to improve the patients ability to perform ADLs without pain          min Patient Education:  Yes    [x] Reviewed HEP   []  Progressed/Changed HEP based on: Other Objective/Functional Measures:    Pt with no pain at the start of therapy today  Less rigid and swollen in proximal hip today   Post Treatment Pain Level (on 0 to 10) scale:   1  / 10     ASSESSMENT  Assessment/Changes in Function:     Pt progressing well  Still ambulating with st cane   []  See Progress Note/Recertification   Patient will continue to benefit from skilled PT services to modify and progress therapeutic interventions, address functional mobility deficits, address ROM deficits, address strength deficits, analyze and address soft tissue restrictions, analyze and cue movement patterns, analyze and modify body mechanics/ergonomics and assess and modify postural abnormalities to attain remaining goals.    Progress toward goals / Updated goals:    Improved PROM and less pain     PLAN  [x]  Upgrade activities as tolerated YES Continue plan of care   []  Discharge due to :    []  Other:      Therapist: Regino Park PT    Date: 9/19/2017 Time: 5:24 PM        Future Appointments  Date Time Provider Shahbaz Quintero   9/21/2017 4:00 PM Willow Holley [facility]   9/26/2017 4:00 PM Willow Holley [facility]   9/28/2017 4:00 PM Willow Holley [facility]   10/3/2017 3:30 PM Regino Park PT [facility]   10/5/2017 3:30 PM Juanpablo Ryan [facility]   10/10/2017 3:30 PM Juanpablo Staufferase [facility]   10/12/2017 3:30 PM Regino Park PT [facility]

## 2017-09-21 ENCOUNTER — HOSPITAL ENCOUNTER (OUTPATIENT)
Dept: PHYSICAL THERAPY | Age: 47
Discharge: HOME | End: 2017-09-21
Payer: COMMERCIAL

## 2017-09-21 PROCEDURE — 97140 MANUAL THERAPY 1/> REGIONS: CPT

## 2017-09-21 PROCEDURE — 97110 THERAPEUTIC EXERCISES: CPT

## 2017-09-21 NOTE — PROGRESS NOTES
PHYSICAL THERAPY - DAILY TREATMENT NOTE    Patient Name: Sly Servin        Date: 2017  : 1970   YES Patient  Verified  Visit #:   4   of   12  Insurance: Payor: Paco Arenas / Plan: [facility] PPO / Product Type: PPO /      In time: 4:05pm Out time: 5:10pm   Total Treatment Time: 65/     Medicare Time Tracking (below)   Total Timed Codes (min):  na 1:1 Treatment Time:  na     TREATMENT AREA =  History of total right hip replacement [Z96.641]  SUBJECTIVE  Pain Level (on 0 to 10 scale):  0  / 10   Medication Changes/New allergies or changes in medical history, any new surgeries or procedures? NO    If yes, update Summary List   Subjective Functional Status/Changes:  []  No changes reported     Pt reports tenderness after sitting all day at work. Noted mild edema present in R hip. OBJECTIVE  Modalities Rationale:     decrease inflammation, decrease pain and increase tissue extensibility to improve patient's ability to perform functional ADLs   min [] Estim, type/location:                                      []  att     []  unatt     []  w/US     []  w/ice    []  w/heat    min []  Mechanical Traction: type/lbs                   []  pro   []  sup   []  int   []  cont    []  before manual    []  after manual    min []  Ultrasound, settings/location:      min []  Iontophoresis w/ dexamethasone, location:                                               []  take home patch       []  in clinic   10 min [x]  Ice     []  Heat    location/position: Supine to R hip post treatment.      min []  Vasopneumatic Device, press/temp:     min []  Other:    [] Skin assessment post-treatment (if applicable):    []  intact    []  redness- no adverse reaction     []redness  adverse reaction:        35 min Therapeutic Exercise:  [x]  See flow sheet   Rationale:      increase ROM and increase strength to improve the patients ability to regain functional mobility of R hip for pain-free amb.    10 min Manual Therapy: STM to RE proximal quad/lateral hip, R hip PROM   Rationale:      decrease pain, increase ROM, increase tissue extensibility and decrease trigger points to improve the patient's ability to regain full functional mobility     min Therapeutic Activity:    Rationale:    increase strength, improve coordination and increase proprioception to improve the patients ability to      min Neuromuscular Re-ed:    Rationale:    improve coordination, improve balance and increase proprioception to improve the patients ability to      min Gait Training:    Rationale:       min Patient Education:  YES  Reviewed HEP   []  Progressed/Changed HEP based on: Other Objective/Functional Measures: Added SLRs & SLS with 1 UE support. Post Treatment Pain Level (on 0 to 10) scale:   1 (sore)  / 10     ASSESSMENT  Assessment/Changes in Function:   Noted patient fatigued quickly following glut med specific exercises. VC's needed on glut med activation during SLS with 1 UE support to promote a level pelvis. []  See Progress Note/Recertification   Patient will continue to benefit from skilled PT services to modify and progress therapeutic interventions, address functional mobility deficits, address ROM deficits, address strength deficits, analyze and address soft tissue restrictions, analyze and cue movement patterns, analyze and modify body mechanics/ergonomics and assess and modify postural abnormalities to attain remaining goals. Progress toward goals / Updated goals:    Progressing towards newly established goals.       PLAN  [x]  Upgrade activities as tolerated YES Continue plan of care   []  Discharge due to :    []  Other:      Therapist: LUNA Boggs    Date: 9/21/2017 Time: 2:03 PM     Future Appointments  Date Time Provider Shahbaz Quintero   9/21/2017 4:00 PM [facility]   9/26/2017 4:00 PM [facility]   9/28/2017 4:00 PM Juanpablo Rivera [facility] AT [facility] 10/3/2017 3:30 PM Denice Fonseca, PT REHAB [facility]ENTER AT [facility]   10/5/2017 3:30 PM Doretha Crowell [facility] AT [facility]   10/10/2017 3:30 PM Juanpablo Crowell [facility] AT [facility]   10/12/2017 3:30 PM Denice Fonseca, PT [facility] AT [facility]

## 2017-09-26 ENCOUNTER — HOSPITAL ENCOUNTER (OUTPATIENT)
Dept: PHYSICAL THERAPY | Age: 47
Discharge: HOME | End: 2017-09-26
Payer: COMMERCIAL

## 2017-09-26 PROCEDURE — 97110 THERAPEUTIC EXERCISES: CPT

## 2017-09-26 NOTE — PROGRESS NOTES
PHYSICAL THERAPY - DAILY TREATMENT NOTE    Patient Name: Estefany Whitmore        Date: 2017  : 1970   YES Patient  Verified  Visit #:      of   12  Insurance: Payor: Roberto Burch / Plan: [id number] PPO / Product Type: PPO /      In time: 4:00pm Out time: 5:05pm   Total Treatment Time: 60/55     Medicare Time Tracking (below)   Total Timed Codes (min):  na 1:1 Treatment Time:  na     TREATMENT AREA =  History of total right hip replacement [Z96.641]  SUBJECTIVE  Pain Level (on 0 to 10 scale):  0  / 10   Medication Changes/New allergies or changes in medical history, any new surgeries or procedures? NO    If yes, update Summary List   Subjective Functional Status/Changes:  []  No changes reported     \"I am able to walk without the cane. \"          OBJECTIVE  Modalities Rationale:     decrease inflammation, decrease pain and increase tissue extensibility to improve patient's ability to perform functional ADLs   min [] Estim, type/location:                                      []  att     []  unatt     []  w/US     []  w/ice    []  w/heat    min []  Mechanical Traction: type/lbs                   []  pro   []  sup   []  int   []  cont    []  before manual    []  after manual    min []  Ultrasound, settings/location:      min []  Iontophoresis w/ dexamethasone, location:                                               []  take home patch       []  in clinic   10 min [x]  Ice     []  Heat    location/position: Supine to R hip post treatment.     min []  Vasopneumatic Device, press/temp:     min []  Other:    [] Skin assessment post-treatment (if applicable):    []  intact    []  redness- no adverse reaction     []redness  adverse reaction:        45 min Therapeutic Exercise:  [x]  See flow sheet   Rationale:      increase ROM and increase strength to improve the patients ability to regain functional mobility of R hip for pain-free amb.     min Manual Therapy:    Rationale:      decrease pain, increase ROM, increase tissue extensibility and decrease trigger points to improve the patient's ability to regain full functional mobility     min Therapeutic Activity:    Rationale:    increase strength, improve coordination and increase proprioception to improve the patients ability to      min Neuromuscular Re-ed:    Rationale:    improve coordination, improve balance and increase proprioception to improve the patients ability to      min Gait Training:    Rationale:       min Patient Education:  YES  Reviewed HEP   []  Progressed/Changed HEP based on: Other Objective/Functional Measures: Added hip 3 way in // bars to challenge hip stability. Held on manual to focus on strengthening and self stretches. Post Treatment Pain Level (on 0 to 10) scale:   0  / 10     ASSESSMENT  Assessment/Changes in Function:   Noted mild pain when patient unloads her weight/relaxes muscles following SLS/hip 3 way. Pt continues to fatigue quickly with glut specific exercises. []  See Progress Note/Recertification   Patient will continue to benefit from skilled PT services to modify and progress therapeutic interventions, address functional mobility deficits, address ROM deficits, address strength deficits, analyze and address soft tissue restrictions, analyze and cue movement patterns, analyze and modify body mechanics/ergonomics and assess and modify postural abnormalities to attain remaining goals. Progress toward goals / Updated goals:    Progressing towards strength & ROM goals for improved gait without pain.       PLAN  [x]  Upgrade activities as tolerated YES Continue plan of care   []  Discharge due to :    []  Other:      Therapist: Rainell Denver, LPTA    Date: 9/26/2017 Time: 3:57 PM     Future Appointments  Date Time Provider Shahbaz Quintero   9/26/2017 4:00 PM Abraham Fuller [facility]   9/28/2017 4:00 PM Abraham Fuller [facility]   10/3/2017 3:30 PM Thuan Verdin, PT REHAB [facility]ENTER AT [facility] 10/5/2017 3:30 PM Brooks Laughlin REH[facility] AT [facility]   10/10/2017 3:30 PM Kindra Mathews, PT REH[facility]ENTER AT [facility]   10/12/2017 3:30 PM Loreatha Nageotte, PT REH[facility]ENTER AT [facility]

## 2017-09-28 ENCOUNTER — HOSPITAL ENCOUNTER (OUTPATIENT)
Dept: PHYSICAL THERAPY | Age: 47
Discharge: HOME | End: 2017-09-28
Payer: COMMERCIAL

## 2017-09-28 PROCEDURE — 97140 MANUAL THERAPY 1/> REGIONS: CPT

## 2017-09-28 PROCEDURE — 97110 THERAPEUTIC EXERCISES: CPT

## 2017-09-28 NOTE — PROGRESS NOTES
PHYSICAL THERAPY - DAILY TREATMENT NOTE    Patient Name: Alicia Yoon        Date: 2017  : 1970   YES Patient  Verified  Visit #:     Insurance: Payor: Atul Marks / Plan: [facility] PPO / Product Type: PPO /      In time: 4:05pm Out time: 5:00pm   Total Treatment Time: 55     Medicare Time Tracking (below)   Total Timed Codes (min):  na 1:1 Treatment Time:  na     TREATMENT AREA =  History of total right hip replacement [Z96.641]  SUBJECTIVE  Pain Level (on 0 to 10 scale):  5  / 10   Medication Changes/New allergies or changes in medical history, any new surgeries or procedures? NO    If yes, update Summary List   Subjective Functional Status/Changes:  []  No changes reported     Pt reports being sore after last visit and woke up this morning in pain. Pt states she walked up and down 23 stairs at work yesterday and wore different shoes. She currently had to transition back to the [facility] for assistance.           OBJECTIVE  Modalities Rationale:     decrease inflammation, decrease pain and increase tissue extensibility to improve patient's ability to perform functional ADLs   min [] Estim, type/location:                                      []  att     []  unatt     []  w/US     []  w/ice    []  w/heat    min []  Mechanical Traction: type/lbs                   []  pro   []  sup   []  int   []  cont    []  before manual    []  after manual    min []  Ultrasound, settings/location:      min []  Iontophoresis w/ dexamethasone, location:                                               []  take home patch       []  in clinic   10 min [x]  Ice     []  Heat    location/position: L S/L to right hip.    min []  Vasopneumatic Device, press/temp:     min []  Other:    [] Skin assessment post-treatment (if applicable):    []  intact    []  redness- no adverse reaction     []redness  adverse reaction:        35 min Therapeutic Exercise:  [x]  See flow sheet   Rationale:      increase ROM and increase strength to improve the patients ability to regain functional mobility of R hip for pain-free amb. 10 min Manual Therapy: STM to lateral and anterior hip   Rationale:      decrease pain, increase ROM, increase tissue extensibility and decrease trigger points to improve the patient's ability to regain full functional mobility     min Therapeutic Activity:    Rationale:    increase strength, improve coordination and increase proprioception to improve the patients ability to      min Neuromuscular Re-ed:    Rationale:    improve coordination, improve balance and increase proprioception to improve the patients ability to      min Gait Training:    Rationale:       min Patient Education:  YES  Reviewed HEP   []  Progressed/Changed HEP based on: Other Objective/Functional Measures:    Held on WBing exercises per FS to focus on pain/edema control. Post Treatment Pain Level (on 0 to 10) scale:   4  / 10     ASSESSMENT  Assessment/Changes in Function:   Edema present in lateral hip, however pain improved post MT. Educated patient on icing to control edema while avoiding strenuous activities this weekend. Pt continues to be challenged with glut med specific exercises. []  See Progress Note/Recertification   Patient will continue to benefit from skilled PT services to modify and progress therapeutic interventions, address functional mobility deficits, address ROM deficits, address strength deficits, analyze and address soft tissue restrictions, analyze and cue movement patterns, analyze and modify body mechanics/ergonomics and assess and modify postural abnormalities to attain remaining goals. Progress toward goals / Updated goals:    Progressing towards LTGs.      PLAN  [x]  Upgrade activities as tolerated YES Continue plan of care   []  Discharge due to :    []  Other:      Therapist: LUNA Zambrano    Date: 9/28/2017 Time: 7:56 PM     Future Appointments  Date Time Provider Shahbaz Quintero   10/3/2017 3:30 PM Carlos Browne, PT REH[facility] AT [facility]   10/5/2017 3:30 PM Leland Padilla REH[facility] AT [facility]   10/10/2017 3:30 PM Nilson Middleton PT REHAB CENTER AT [facility]   10/12/2017 3:30 PM Carlos Browne, PT REHAB [facility]ENTER AT [facility]

## 2017-10-03 ENCOUNTER — HOSPITAL ENCOUNTER (OUTPATIENT)
Dept: PHYSICAL THERAPY | Age: 47
Discharge: HOME | End: 2017-10-03
Payer: COMMERCIAL

## 2017-10-03 PROCEDURE — 97140 MANUAL THERAPY 1/> REGIONS: CPT

## 2017-10-03 PROCEDURE — 97110 THERAPEUTIC EXERCISES: CPT

## 2017-10-03 NOTE — PROGRESS NOTES
PHYSICAL THERAPY - DAILY TREATMENT NOTE    Patient Name: Azalea Fleischer        Date: 10/3/2017  : 1970    Patient  Verified: YES  Visit #:     Insurance: Payor: Aishwarya Bear / Plan: [facility] PPO / Product Type: PPO /      In time: 3:30 Out time: 4:20   Total Treatment Time: 50     Medicare Time Tracking (below)   Total Timed Codes (min):  - 1:1 Treatment Time:  -     TREATMENT AREA/ DIAGNOSIS = History of total right hip replacement [Z96.641]    SUBJECTIVE  Pain Level (on 0 to 10 scale):  2  / 10   Medication Changes/New allergies or changes in medical history, any new surgeries or procedures?     NO    If yes, update Summary List   Subjective Functional Status/Changes:  []  No changes reported     Functional improvement    Functional limitation I was sore after the last visit      OBJECTIVE  Modalities Rationale:     decrease edema, decrease inflammation and decrease pain to improve patient's ability to perform ADLs without pain   min [] Estim, type/location:                                      []  att     []  unatt     []  w/US     []  w/ice    []  w/heat    min []  Mechanical Traction: type/lbs                   []  pro   []  sup   []  int   []  cont    []  before manual    []  after manual    min []  Ultrasound, settings/location:      min []  Iontophoresis w/ dexamethasone, location:                                               []  take home patch       []  in clinic   10 min [x]  Ice     []  Heat    location/position:     min []  Vasopneumatic Device, press/temp:     min []  Other:    [x] Skin assessment post-treatment (if applicable):    [x]  intact    [x]  redness- no adverse reaction     []redness  adverse reaction:        10 min Manual Therapy: CFM to incision, DTM/STM to R hip/thigh, PROM to R hip   Rationale:      decrease pain, increase ROM and increase tissue extensibility to improve patient's ability to perform ADLs without pain    30 min Therapeutic Exercise:  [x]  See flow sheet   Rationale:      increase ROM and increase strength to improve the patients ability to perform ADLs without pain          min Patient Education:  Yes    [x] Reviewed HEP   []  Progressed/Changed HEP based on: Other Objective/Functional Measures:    Pt with pain/soreness in R groin after the last visit  Decreased SLS activities in hopes of less residual pain   Post Treatment Pain Level (on 0 to 10) scale:   0  / 10     ASSESSMENT  Assessment/Changes in Function:     Pt with improving gait without AD     []  See Progress Note/Recertification   Patient will continue to benefit from skilled PT services to modify and progress therapeutic interventions, address functional mobility deficits, address ROM deficits, address strength deficits, analyze and address soft tissue restrictions, analyze and cue movement patterns, analyze and modify body mechanics/ergonomics and assess and modify postural abnormalities to attain remaining goals.    Progress toward goals / Updated goals:    Pt with less pain and improved strength     PLAN  [x]  Upgrade activities as tolerated YES Continue plan of care   []  Discharge due to :    []  Other:      Therapist: Josh Lyon PT    Date: 10/3/2017 Time: 4:36 PM        Future Appointments  Date Time Provider Shahbaz Quintero   10/5/2017 3:30 PM Sylvester Ferguson [facility] AT [facility]   10/10/2017 3:30 PM Tyree Jean, PT REH[facility]ENTER AT [facility]   10/12/2017 3:30 PM Josh Lyon PT REHAB [facility]ENTER AT [facility]

## 2017-10-05 ENCOUNTER — HOSPITAL ENCOUNTER (OUTPATIENT)
Dept: PHYSICAL THERAPY | Age: 47
Discharge: HOME | End: 2017-10-05
Payer: COMMERCIAL

## 2017-10-05 PROCEDURE — 97110 THERAPEUTIC EXERCISES: CPT

## 2017-10-05 PROCEDURE — 97140 MANUAL THERAPY 1/> REGIONS: CPT

## 2017-10-05 NOTE — PROGRESS NOTES
PHYSICAL THERAPY - DAILY TREATMENT NOTE    Patient Name: Kayleigh Whaley        Date: 10/5/2017  : 1970    Patient  Verified: YES  Visit #:   8   of   12  Insurance: Payor: Jass Mccormick / Plan: [facility][1] PPO / Product Type: PPO /      In time: 3 Out time: 3:50   Total Treatment Time: 40     Medicare Time Tracking (below)   Total Timed Codes (min):  - 1:1 Treatment Time:  -     TREATMENT AREA/ DIAGNOSIS = History of total right hip replacement [Z96.641]    SUBJECTIVE  Pain Level (on 0 to 10 scale):  2  / 10   Medication Changes/New allergies or changes in medical history, any new surgeries or procedures?     NO    If yes, update Summary List   Subjective Functional Status/Changes:  []  No changes reported     Functional improvement less pain after easier workout with less WB activities   Functional limitation       OBJECTIVE  Modalities Rationale:     decrease edema to improve patient's ability to perform ADLs without pain   min [] Estim, type/location:                                      []  att     []  unatt     []  w/US     []  w/ice    []  w/heat    min []  Mechanical Traction: type/lbs                   []  pro   []  sup   []  int   []  cont    []  before manual    []  after manual    min []  Ultrasound, settings/location:      min []  Iontophoresis w/ dexamethasone, location:                                               []  take home patch       []  in clinic   10 min [x]  Ice     []  Heat    location/position:     min []  Vasopneumatic Device, press/temp:     min []  Other:    [x] Skin assessment post-treatment (if applicable):    [x]  intact    [x]  redness- no adverse reaction     []redness  adverse reaction:        10 min Manual Therapy: DTM to R lateral ant hip, CFM to incision, PROM to R hip  (flex, ext, abd ER IR)   Rationale:      decrease pain, increase ROM and increase tissue extensibility to improve patient's ability to perform ADLs without pain    30 min Therapeutic Exercise:  [x]  See flow sheet   Rationale:      increase ROM and increase strength to improve the patients ability to perform ADLs without pain          min Patient Education:  Yes    [x] Reviewed HEP   []  Progressed/Changed HEP based on: Other Objective/Functional Measures:    Pt with less pain after decreasing WB threrex  Added bike as warm up   Continued to limit SLS activities in PT   Post Treatment Pain Level (on 0 to 10) scale:   0  / 10     ASSESSMENT  Assessment/Changes in Function:     Pt responded well to limiting SLS activities this week     []  See Progress Note/Recertification   Patient will continue to benefit from skilled PT services to modify and progress therapeutic interventions, address functional mobility deficits, address ROM deficits, address strength deficits, analyze and address soft tissue restrictions, analyze and cue movement patterns and analyze and modify body mechanics/ergonomics to attain remaining goals.    Progress toward goals / Updated goals:    Less pain  Full passive ER     PLAN  [x]  Upgrade activities as tolerated YES Continue plan of care   []  Discharge due to :    []  Other:      Therapist: Juan Nascimento PT    Date: 10/5/2017 Time: 3:06 PM        Future Appointments  Date Time Provider Shahbaz Quintero   10/10/2017 3:30 PM Cornel Moreira [facility]   10/12/2017 3:30 PM Juan Nascimento PT [facility]

## 2017-10-10 ENCOUNTER — HOSPITAL ENCOUNTER (OUTPATIENT)
Dept: PHYSICAL THERAPY | Age: 47
Discharge: HOME | End: 2017-10-10
Payer: COMMERCIAL

## 2017-10-10 PROCEDURE — 97140 MANUAL THERAPY 1/> REGIONS: CPT

## 2017-10-10 PROCEDURE — 97110 THERAPEUTIC EXERCISES: CPT

## 2017-10-10 NOTE — PROGRESS NOTES
PHYSICAL THERAPY - DAILY TREATMENT NOTE    Patient Name: Kaleb Wang        Date: 10/10/2017  : 1970   YES Patient  Verified  Visit #:     Insurance: Payor: [employer] / Plan: [facility] PPO / Product Type: PPO /      In time: 3:40pm Out time: 4:35pm   Total Treatment Time: 55     Medicare Time Tracking (below)   Total Timed Codes (min):  na 1:1 Treatment Time:  na     TREATMENT AREA =  History of total right hip replacement [Z96.641]  SUBJECTIVE  Pain Level (on 0 to 10 scale):  1  / 10   Medication Changes/New allergies or changes in medical history, any new surgeries or procedures? NO    If yes, update Summary List   Subjective Functional Status/Changes:  []  No changes reported     \"I did great up until  after working in my flower beds outside\"       OBJECTIVE  Modalities Rationale:     decrease inflammation, decrease pain and increase tissue extensibility to improve patient's ability to perform functional ADLs   min [] Estim, type/location:                                      []  att     []  unatt     []  w/US     []  w/ice    []  w/heat    min []  Mechanical Traction: type/lbs                   []  pro   []  sup   []  int   []  cont    []  before manual    []  after manual    min []  Ultrasound, settings/location:      min []  Iontophoresis w/ dexamethasone, location:                                               []  take home patch       []  in clinic   10 min [x]  Ice     []  Heat    location/position: Supine to R hip post treatment. min []  Vasopneumatic Device, press/temp:     min []  Other:    [] Skin assessment post-treatment (if applicable):    []  intact    []  redness- no adverse reaction     []redness  adverse reaction:        35 min Therapeutic Exercise:  [x]  See flow sheet   Rationale:      increase ROM and increase strength to improve the patients ability to regain functional mobility of R hip for pain-free ADL's.     10 min Manual Therapy: DTM to R lateral ant hip, PROM to R hip  (flex, ext, abd ER IR)   Rationale:      decrease pain, increase ROM, increase tissue extensibility and decrease trigger points to improve the patient's ability to regain full functional mobility     min Therapeutic Activity:    Rationale:    increase strength, improve coordination and increase proprioception to improve the patients ability to      min Neuromuscular Re-ed:    Rationale:    improve coordination, improve balance and increase proprioception to improve the patients ability to      min Gait Training:    Rationale:       min Patient Education:  YES  Reviewed HEP   []  Progressed/Changed HEP based on: Other Objective/Functional Measures:    Progressed pt back to SLS 5/10\" with focus on glut activation. Post Treatment Pain Level (on 0 to 10) scale:   0  / 10     ASSESSMENT  Assessment/Changes in Function:   Patient was able to perform SLS with no inc in pain. Minor cueing on maintaining a level pelvis for proper glut med activation. []  See Progress Note/Recertification   Patient will continue to benefit from skilled PT services to modify and progress therapeutic interventions, address functional mobility deficits, address ROM deficits, address strength deficits, analyze and address soft tissue restrictions, analyze and cue movement patterns, analyze and modify body mechanics/ergonomics and assess and modify postural abnormalities to attain remaining goals. Progress toward goals / Updated goals:    Progressing towards strength & ROM goals.       PLAN  [x]  Upgrade activities as tolerated YES Continue plan of care   []  Discharge due to :    []  Other:      Therapist: LUNA Esqueda    Date: 10/10/2017 Time: 7:14 PM     Future Appointments  Date Time Provider Shahbaz Quintero   10/12/2017 3:30 PM Jair Spencer, PT REH[facility] AT [facility]

## 2017-10-12 ENCOUNTER — HOSPITAL ENCOUNTER (OUTPATIENT)
Dept: PHYSICAL THERAPY | Age: 47
Discharge: HOME | End: 2017-10-12
Payer: COMMERCIAL

## 2017-10-12 PROCEDURE — 97140 MANUAL THERAPY 1/> REGIONS: CPT

## 2017-10-12 PROCEDURE — 97110 THERAPEUTIC EXERCISES: CPT

## 2017-10-12 NOTE — PROGRESS NOTES
[facility] PHYSICAL THERAPY AT [facility] [address], [address]  Phone: [phone]  Fax: [phone]  PROGRESS NOTE  Patient Name: Pippa Cullen : 1970   Treatment/Medical Diagnosis: History of total right hip replacement [G97.752]   Referral Source: Jan Logan MD     Date of Initial Visit: 17 Attended Visits: 10 Missed Visits: 0     SUMMARY OF TREATMENT  Manual therapy, including massage and PROM. LE strengthening with focus on hip strength. CURRENT STATUS  The patient is walking well, without an assistive device. She can walk > 1 mile with minimal discomfort. SHe does lack full hip extension. She has been slow to tolerate single limb WB activities. SHe has been having some mild R groin pain. Goal/Measure of Progress Goal Met? 1. All ADLs without R hip pain   Status at last Eval: Pain up to 4/10  Current Status: Pain < 3/10 progressing   2.  normal gait without AD or deviation for > 1/2 mile   Status at last Eval: Pt using st cane with R antalgia and lacking proper hip ext Current Status: Low pain walking for > 1 mile without pain, without assistive device yes   3. Increase R hip active ER to > 35 degrees to indicate increased strength   Status at last Eval: 24 degrees of R hip active ER Current Status: 39 degrees of active ER yes   4.  > 15 degrees of passive R hip ext to allow normal gait   Status at last Eval: < 5 degrees of R hip ext Current Status: 10 degrees fo passive hip ext progressing     New Goals to be achieved in __4-5__  weeks:  1. Pain free ADLs   2. Increase R SLS to > 10 sec to indicate increased balance and stability   3. Independent with ADLs   4.  -   RECOMMENDATIONS  Continue PT 2x/week x 4-5 weeks  If you have any questions/comments please contact us directly at [phone]. Thank you for allowing us to assist in the care of your patient. Therapist Signature:  Jackelyn Velasquez, PT, MDT Date: 10/12/2017     Time: 10:03 AM   NOTE TO PHYSICIAN:  PLEASE COMPLETE THE ORDERS BELOW AND FAX TO   [facility] at [address]: [phone]. If you are unable to process this request in 24 hours please contact our office: [phone].    ___ I have read the above report and request that my patient continue as recommended.   ___ I have read the above report and request that my patient continue therapy with the following changes/special instructions:_________________________________________________________   ___ I have read the above report and request that my patient be discharged from therapy.      Physician Signature:        Date:       Time:

## 2017-10-12 NOTE — PROGRESS NOTES
PHYSICAL THERAPY - DAILY TREATMENT NOTE    Patient Name: Sudarshan Dalal        Date: 10/12/2017  : 1970    Patient  Verified: YES  Visit #:   10   of   16  Insurance: Payor: Margarito Martinez / Plan: Mar Young [id number] PPO / Product Type: PPO /      In time: 4:05 Out time: 5:05   Total Treatment Time: 60     Medicare Time Tracking (below)   Total Timed Codes (min):  - 1:1 Treatment Time:  -     TREATMENT AREA/ DIAGNOSIS = History of total right hip replacement [Z96.641]    SUBJECTIVE  Pain Level (on 0 to 10 scale):  2  / 10   Medication Changes/New allergies or changes in medical history, any new surgeries or procedures?     NO    If yes, update Summary List   Subjective Functional Status/Changes:  []  No changes reported     Functional improvement  I have been walking about a mile without pain  Functional limitation       OBJECTIVE  Modalities Rationale:     decrease edema, decrease inflammation and decrease pain to improve patient's ability to perform ADLs without pain   min [] Estim, type/location:                                      []  att     []  unatt     []  w/US     []  w/ice    []  w/heat    min []  Mechanical Traction: type/lbs                   []  pro   []  sup   []  int   []  cont    []  before manual    []  after manual    min []  Ultrasound, settings/location:      min []  Iontophoresis w/ dexamethasone, location:                                               []  take home patch       []  in clinic   10 min [x]  Ice     []  Heat    location/position:     min []  Vasopneumatic Device, press/temp:     min []  Other:    [x] Skin assessment post-treatment (if applicable):    [x]  intact    [x]  redness- no adverse reaction     []redness  adverse reaction:        10 min Manual Therapy: DTM to R ant/lateral hip, R hip PROM   Rationale:      decrease pain, increase ROM and increase tissue extensibility to improve patient's ability to perform ADLs without pain    40 min Therapeutic Exercise: [x]  See flow sheet   Rationale:      increase ROM, increase strength and improve balance to improve the patients ability to perform ADLs without pain          min Patient Education:  Yes    [x] Reviewed HEP   []  Progressed/Changed HEP based on: Other Objective/Functional Measures:    See PN   Post Treatment Pain Level (on 0 to 10) scale:   1  / 10     ASSESSMENT  Assessment/Changes in Function:     See PN     [x]  See Progress Note/Recertification   Patient will continue to benefit from skilled PT services to modify and progress therapeutic interventions, address functional mobility deficits, address ROM deficits, address strength deficits, analyze and address soft tissue restrictions, analyze and cue movement patterns, analyze and modify body mechanics/ergonomics and assess and modify postural abnormalities to attain remaining goals. Progress toward goals / Updated goals:    See PN     PLAN  [x]  Upgrade activities as tolerated YES Continue plan of care   []  Discharge due to :    []  Other:      Therapist: Juan Nascimento PT    Date: 10/12/2017 Time: 4:55 PM        No future appointments.

## 2017-10-17 ENCOUNTER — HOSPITAL ENCOUNTER (OUTPATIENT)
Dept: PHYSICAL THERAPY | Age: 47
Discharge: HOME | End: 2017-10-17
Payer: COMMERCIAL

## 2017-10-17 PROCEDURE — 97110 THERAPEUTIC EXERCISES: CPT

## 2017-10-17 PROCEDURE — 97140 MANUAL THERAPY 1/> REGIONS: CPT

## 2017-10-17 NOTE — PROGRESS NOTES
PHYSICAL THERAPY - DAILY TREATMENT NOTE    Patient Name: Aidan Perkins        Date: 10/17/2017  : 1970   YES Patient  Verified  Visit #:     Insurance: Payor: BLUE CROSS / Plan: Mar Young [id number] PPO / Product Type: PPO /      In time: 4:30pm Out time: 5:30pm   Total Treatment Time: 60     Medicare Time Tracking (below)   Total Timed Codes (min):  na 1:1 Treatment Time:  na     TREATMENT AREA =  History of total right hip replacement [Z96.641]  SUBJECTIVE  Pain Level (on 0 to 10 scale):  2  / 10   Medication Changes/New allergies or changes in medical history, any new surgeries or procedures? NO    If yes, update Summary List   Subjective Functional Status/Changes:  []  No changes reported     Pt reports feeling a little sore today after squatting and moving her office to a new location. Pt states she still has mild grown pain occasionally. OBJECTIVE  Modalities Rationale:     decrease inflammation, decrease pain and increase tissue extensibility to improve patient's ability to perform functional ADLs   min [] Estim, type/location:                                      []  att     []  unatt     []  w/US     []  w/ice    []  w/heat    min []  Mechanical Traction: type/lbs                   []  pro   []  sup   []  int   []  cont    []  before manual    []  after manual    min []  Ultrasound, settings/location:      min []  Iontophoresis w/ dexamethasone, location:                                               []  take home patch       []  in clinic   10 min [x]  Ice     []  Heat    location/position: S/L to R hip post treatment.      min []  Vasopneumatic Device, press/temp:     min []  Other:    [] Skin assessment post-treatment (if applicable):    []  intact    []  redness- no adverse reaction     []redness  adverse reaction:        40 min Therapeutic Exercise:  [x]  See flow sheet   Rationale:      increase ROM and increase strength to improve the patients ability to regain functional mobility of R hip for pain-free amb and stair negotiation. 10 min Manual Therapy: DTM to R ant/lateral hip, R hip PROM   Rationale:      decrease pain, increase ROM, increase tissue extensibility and decrease trigger points to improve the patient's ability to regain full functional mobility     min Therapeutic Activity:    Rationale:    increase strength, improve coordination and increase proprioception to improve the patients ability to      min Neuromuscular Re-ed:    Rationale:    improve coordination, improve balance and increase proprioception to improve the patients ability to      min Gait Training:    Rationale:       min Patient Education:  YES  Reviewed HEP   []  Progressed/Changed HEP based on: Other Objective/Functional Measures:    Performed upright bike today due to mild hip discomfort with recumbent bike      Post Treatment Pain Level (on 0 to 10) scale:   0  / 10     ASSESSMENT  Assessment/Changes in Function:   Patient was able to tolerate SLS with no inc in pain especially when unloading WB through the hip. []  See Progress Note/Recertification   Patient will continue to benefit from skilled PT services to modify and progress therapeutic interventions, address functional mobility deficits, address ROM deficits, address strength deficits, analyze and address soft tissue restrictions, analyze and cue movement patterns, analyze and modify body mechanics/ergonomics and assess and modify postural abnormalities to attain remaining goals. Progress toward goals / Updated goals:    Progressing towards increasing SLS time for improving gait mechanics during midstance.      PLAN  [x]  Upgrade activities as tolerated YES Continue plan of care   []  Discharge due to :    []  Other:      Therapist: LUNA Baker    Date: 10/17/2017 Time: 4:15 PM     Future Appointments  Date Time Provider Shahbaz Quintero   10/17/2017 4:30 PM [facility]   10/19/2017 4:30 PM Sylvester Gradye REHAB CENTER AT [facility]   10/24/2017 4:00 PM Josh Lyon PT REHAB CENTER AT [facility]   10/26/2017 4:00 PM Sylvester Gradye REHAB CENTER AT [facility]   10/31/2017 4:00 PM Sylvester Gradye REHAB CENTER AT [facility]   11/2/2017 4:00 PM Sylvester Gradye REHAB CENTER AT [facility]   11/7/2017 4:00 PM Josh Lyon PT REHAB CENTER AT [facility]   11/9/2017 4:00 PM Josh Lyon PT REHAB CENTER AT [facility]

## 2017-10-19 ENCOUNTER — APPOINTMENT (OUTPATIENT)
Dept: PHYSICAL THERAPY | Age: 47
End: 2017-10-19
Payer: COMMERCIAL

## 2017-10-24 ENCOUNTER — HOSPITAL ENCOUNTER (OUTPATIENT)
Dept: PHYSICAL THERAPY | Age: 47
Discharge: HOME | End: 2017-10-24
Payer: COMMERCIAL

## 2017-10-24 PROCEDURE — 97140 MANUAL THERAPY 1/> REGIONS: CPT

## 2017-10-24 PROCEDURE — 97110 THERAPEUTIC EXERCISES: CPT

## 2017-10-26 ENCOUNTER — HOSPITAL ENCOUNTER (OUTPATIENT)
Dept: PHYSICAL THERAPY | Age: 47
Discharge: HOME | End: 2017-10-26
Payer: COMMERCIAL

## 2017-10-26 PROCEDURE — 97110 THERAPEUTIC EXERCISES: CPT

## 2017-10-26 PROCEDURE — 97140 MANUAL THERAPY 1/> REGIONS: CPT

## 2017-10-26 NOTE — PROGRESS NOTES
PHYSICAL THERAPY - DAILY TREATMENT NOTE    Patient Name: Raquel Wilkinson        Date: 10/26/2017  : 1970   YES Patient  Verified  Visit #:   15   of   16  Insurance: Payor: BLUE CROSS / Plan: [facility] PPO / Product Type: PPO /      In time: 4:00pm Out time: 5:15pm   Total Treatment Time: 75/60     Medicare Time Tracking (below)   Total Timed Codes (min):  na 1:1 Treatment Time:  na     TREATMENT AREA =  History of total right hip replacement [Z96.641]  SUBJECTIVE  Pain Level (on 0 to 10 scale):  3  / 10   Medication Changes/New allergies or changes in medical history, any new surgeries or procedures? NO    If yes, update Summary List   Subjective Functional Status/Changes:  []  No changes reported     \"I felt great after last visit but I went shopping the other day and my hip was sore after. \" Pt reports she feels some \"popping\" in her hip, however hasn't been as frequent lately. She continues to have groin pain with increase activity but states she has been sleeping better since surgery. OBJECTIVE  Modalities Rationale:     decrease inflammation, decrease pain and increase tissue extensibility to improve patient's ability to perform functional ADLs   min [] Estim, type/location:                                      []  att     []  unatt     []  w/US     []  w/ice    []  w/heat    min []  Mechanical Traction: type/lbs                   []  pro   []  sup   []  int   []  cont    []  before manual    []  after manual    min []  Ultrasound, settings/location:      min []  Iontophoresis w/ dexamethasone, location:                                               []  take home patch       []  in clinic   10 min [x]  Ice     []  Heat    location/position: Supine to R hip post treatment.      min []  Vasopneumatic Device, press/temp:     min []  Other:    [] Skin assessment post-treatment (if applicable):    []  intact    []  redness- no adverse reaction     []redness  adverse reaction: 40 min Therapeutic Exercise:  [x]  See flow sheet   Rationale:      increase ROM and increase strength to improve the patients ability to regain functional mobility of R hip for pain-free amb without AD. 10 min Manual Therapy: DTM to ant/lateral hip, PROM R hip into extension   Rationale:      decrease pain, increase ROM, increase tissue extensibility and decrease trigger points to improve the patient's ability to regain full functional mobility     min Therapeutic Activity:    Rationale:    increase strength, improve coordination and increase proprioception to improve the patients ability to      min Neuromuscular Re-ed:    Rationale:    improve coordination, improve balance and increase proprioception to improve the patients ability to      min Gait Training:    Rationale:       min Patient Education:  YES  Reviewed HEP   []  Progressed/Changed HEP based on: Other Objective/Functional Measures:    TE per FS while monitoring pain during WBing exercises. Held on SLS for today. Post Treatment Pain Level (on 0 to 10) scale:   1-2  / 10     ASSESSMENT  Assessment/Changes in Function:    VC's on glut activation during gait to promote level pelvis and reduce stress on hip joint. Patient continues to make slow progress with WBing exercises due to R groin pain. []  See Progress Note/Recertification   Patient will continue to benefit from skilled PT services to modify and progress therapeutic interventions, address functional mobility deficits, address ROM deficits, address strength deficits, analyze and address soft tissue restrictions, analyze and cue movement patterns, analyze and modify body mechanics/ergonomics and assess and modify postural abnormalities to attain remaining goals.    Progress toward goals / Updated goals:    Improved AROM and strength, no assistive device     PLAN  [x]  Upgrade activities as tolerated YES Continue plan of care   []  Discharge due to :    []  Other: Therapist: LUNA Carbone    Date: 10/26/2017 Time: 6:53 PM     Future Appointments  Date Time Provider Shahbaz Quintero   10/31/2017 4:00 PM [facility] AT [facility]   11/2/2017 [facility]:00 [facility]   11/7/2017 4:00 PM Michelle Cordero PT REHAB CENTER AT [facility]   11/9/2017 4:00 PM Michelle Cordero PT REHAB CENTER AT [facility]

## 2017-10-31 ENCOUNTER — HOSPITAL ENCOUNTER (OUTPATIENT)
Dept: PHYSICAL THERAPY | Age: 47
Discharge: HOME | End: 2017-10-31
Payer: COMMERCIAL

## 2017-10-31 PROCEDURE — 97140 MANUAL THERAPY 1/> REGIONS: CPT

## 2017-10-31 PROCEDURE — 97110 THERAPEUTIC EXERCISES: CPT

## 2017-10-31 NOTE — PROGRESS NOTES
PHYSICAL THERAPY - DAILY TREATMENT NOTE    Patient Name: Carlos Christy        Date: 10/31/2017  : 1970   YES Patient  Verified  Visit #:   14   of   16  Insurance: Payor: BLUE CROSS / Plan: Mar ERIC Hector [id number] PPO / Product Type: PPO /      In time: 4:50pm Out time: 5:45pm   Total Treatment Time: 55     Medicare Time Tracking (below)   Total Timed Codes (min):  na 1:1 Treatment Time:  na     TREATMENT AREA =  History of total right hip replacement [Z96.641]  SUBJECTIVE  Pain Level (on 0 to 10 scale):  1  / 10   Medication Changes/New allergies or changes in medical history, any new surgeries or procedures? NO    If yes, update Summary List   Subjective Functional Status/Changes:  []  No changes reported     Pt states her appt went well last week with MD and said he wants to work more on stretching the front of the hip and ITB. Pt has been able to walk without much groin pain lately. OBJECTIVE  Modalities Rationale:     PD   min [] Estim, type/location:                                      []  att     []  unatt     []  w/US     []  w/ice    []  w/heat    min []  Mechanical Traction: type/lbs                   []  pro   []  sup   []  int   []  cont    []  before manual    []  after manual    min []  Ultrasound, settings/location:      min []  Iontophoresis w/ dexamethasone, location:                                               []  take home patch       []  in clinic    min [x]  Ice     []  Heat    location/position:     min []  Vasopneumatic Device, press/temp:     min []  Other:    [] Skin assessment post-treatment (if applicable):    []  intact    []  redness- no adverse reaction     []redness  adverse reaction:        45 min Therapeutic Exercise:  [x]  See flow sheet   Rationale:      increase ROM and increase strength to improve the patients ability to regain functional mobility of R hip  for pain-free stair neg.     10 min Manual Therapy: DTM to ant/lateral hip, PROM R hip into extension, STM to ITB   Rationale:      decrease pain, increase ROM, increase tissue extensibility and decrease trigger points to improve the patient's ability to regain full functional mobility     min Therapeutic Activity:    Rationale:    increase strength, improve coordination and increase proprioception to improve the patients ability to      min Neuromuscular Re-ed:    Rationale:    improve coordination, improve balance and increase proprioception to improve the patients ability to      min Gait Training:    Rationale:       min Patient Education:  YES  Reviewed HEP   []  Progressed/Changed HEP based on: Other Objective/Functional Measures:    Supine hip flexor stretch & 6in fwd/lat step ups     Post Treatment Pain Level (on 0 to 10) scale:   1  / 10     ASSESSMENT  Assessment/Changes in Function:   Patient able to tolerate new strength progression with no inc in pain. Minor VC's on glut med activation during step ups to promote level pelvis. []  See Progress Note/Recertification   Patient will continue to benefit from skilled PT services to modify and progress therapeutic interventions, address functional mobility deficits, address ROM deficits, address strength deficits, analyze and address soft tissue restrictions, analyze and cue movement patterns, analyze and modify body mechanics/ergonomics and assess and modify postural abnormalities to attain remaining goals. Progress toward goals / Updated goals:    Progressing towards strength goals.      PLAN  [x]  Upgrade activities as tolerated YES Continue plan of care   []  Discharge due to :    []  Other:      Therapist: LUNA Chavez    Date: 10/31/2017 Time: 4:20 PM     Future Appointments  Date Time Provider Shahbaz Quintero   11/2/2017 4:00 PM Sylvester Ferguson REHAB CENTER AT [facility]   11/7/2017 4:00 PM Josh Lyon PT REHAB CENTER AT [facility]   11/9/2017 4:00 PM Josh Lyon PT REHAB CENTER AT [facility]

## 2017-11-02 ENCOUNTER — HOSPITAL ENCOUNTER (OUTPATIENT)
Dept: PHYSICAL THERAPY | Age: 47
Discharge: HOME | End: 2017-11-02
Payer: COMMERCIAL

## 2017-11-02 PROCEDURE — 97140 MANUAL THERAPY 1/> REGIONS: CPT

## 2017-11-02 PROCEDURE — 97110 THERAPEUTIC EXERCISES: CPT

## 2017-11-02 NOTE — PROGRESS NOTES
PHYSICAL THERAPY - DAILY TREATMENT NOTE    Patient Name: Pauline August        Date: 2017  : 1970   YES Patient  Verified  Visit #:   15   of   16  Insurance: Payor: BLUE CROSS / Plan: Mar Young [id number] PPO / Product Type: PPO /      In time: 4:10pm Out time: 5:15pm   Total Treatment Time: /60     Medicare Time Tracking (below)   Total Timed Codes (min):  na 1:1 Treatment Time:  na     TREATMENT AREA =  History of total right hip replacement [Z96.641]  SUBJECTIVE  Pain Level (on 0 to 10 scale):  1  / 10   Medication Changes/New allergies or changes in medical history, any new surgeries or procedures? NO    If yes, update Summary List   Subjective Functional Status/Changes:  []  No changes reported     Pt reports she was able to wear her dress shoes to work without pain, however hasn't tried going up/down the stairs yet.          OBJECTIVE  Modalities Rationale:     decrease inflammation, decrease pain and increase tissue extensibility to improve patient's ability to perform functional ADLs   min [] Estim, type/location:                                      []  att     []  unatt     []  w/US     []  w/ice    []  w/heat    min []  Mechanical Traction: type/lbs                   []  pro   []  sup   []  int   []  cont    []  before manual    []  after manual    min []  Ultrasound, settings/location:      min []  Iontophoresis w/ dexamethasone, location:                                               []  take home patch       []  in clinic   10 min [x]  Ice     []  Heat    location/position: Supine to R knee & R hip.     min []  Vasopneumatic Device, press/temp:     min []  Other:    [] Skin assessment post-treatment (if applicable):    []  intact    []  redness- no adverse reaction     []redness  adverse reaction:        40 min Therapeutic Exercise:  [x]  See flow sheet   Rationale:      increase ROM and increase strength to improve the patients ability to regain functional mobility of R hip for pain-free stair negotiation. 10 min Manual Therapy: DTM to ant/lateral hip, PROM R hip into extension, STM to ITB with FR   Rationale:      decrease pain, increase ROM, increase tissue extensibility and decrease trigger points to improve the patient's ability to regain full functional mobility     min Therapeutic Activity:    Rationale:    increase strength, improve coordination and increase proprioception to improve the patients ability to      min Neuromuscular Re-ed:    Rationale:    improve coordination, improve balance and increase proprioception to improve the patients ability to      min Gait Training:    Rationale:       min Patient Education:  YES  Reviewed HEP   []  Progressed/Changed HEP based on: Other Objective/Functional Measures:    TE per FS with emphasis on glut engagement for improved gait mechanics. Post Treatment Pain Level (on 0 to 10) scale:   0  / 10     ASSESSMENT  Assessment/Changes in Function:   Patient was able to tolerate SL/WBing activities with no inc in groin/hip pain. []  See Progress Note/Recertification   Patient will continue to benefit from skilled PT services to modify and progress therapeutic interventions, address functional mobility deficits, address ROM deficits, address strength deficits, analyze and address soft tissue restrictions, analyze and cue movement patterns, analyze and modify body mechanics/ergonomics and assess and modify postural abnormalities to attain remaining goals.    Progress toward goals / Updated goals:    Progressing towards LTG 3, LTG 2 met      PLAN  [x]  Upgrade activities as tolerated YES Continue plan of care   []  Discharge due to :    []  Other:      Therapist: LUNA Orlando    Date: 11/2/2017 Time: 2:14 PM     Future Appointments  Date Time Provider Shahbaz Quintero   11/2/2017 4:00 PM Sweta Munoz REHAB CENTER AT [facility]   11/7/2017 4:00 PM Jessica Garcia PT REHAB CENTER AT [facility]   11/9/2017 4:00 PM Jessica Garcia PT REHAB CENTER AT [facility]

## 2017-11-07 ENCOUNTER — HOSPITAL ENCOUNTER (OUTPATIENT)
Dept: PHYSICAL THERAPY | Age: 47
Discharge: HOME | End: 2017-11-07
Payer: COMMERCIAL

## 2017-11-07 PROCEDURE — 97110 THERAPEUTIC EXERCISES: CPT

## 2017-11-07 PROCEDURE — 97140 MANUAL THERAPY 1/> REGIONS: CPT

## 2017-11-07 NOTE — PROGRESS NOTES
PHYSICAL THERAPY - DAILY TREATMENT NOTE    Patient Name: Torie Brennan        Date: 2017  : 1970    Patient  Verified: YES  Visit #:     Insurance: Payor: [employer] [place] [employer] / Plan: Mar Young [id number] PPO / Product Type: PPO /      In time: 4:15 Out time: 5:15   Total Treatment Time: 60     Medicare Time Tracking (below)   Total Timed Codes (min):  - 1:1 Treatment Time:  -     TREATMENT AREA/ DIAGNOSIS = History of total right hip replacement [Z96.641]    SUBJECTIVE  Pain Level (on 0 to 10 scale):  0  / 10   Medication Changes/New allergies or changes in medical history, any new surgeries or procedures?     NO    If yes, update Summary List   Subjective Functional Status/Changes:  []  No changes reported     Functional improvement  I was able to swim without pain for the first time this weekend   Functional limitation my back hurts since this weekend      OBJECTIVE  Modalities Rationale:     decrease edema, decrease inflammation and decrease pain to improve patient's ability to perform ADLs without pain   min [] Estim, type/location:                                      []  att     []  unatt     []  w/US     []  w/ice    []  w/heat    min []  Mechanical Traction: type/lbs                   []  pro   []  sup   []  int   []  cont    []  before manual    []  after manual    min []  Ultrasound, settings/location:      min []  Iontophoresis w/ dexamethasone, location:                                               []  take home patch       []  in clinic   10 min []  Ice     []  Heat    location/position: Ice to R hip and knee and low back    min []  Vasopneumatic Device, press/temp:     min []  Other:    [x] Skin assessment post-treatment (if applicable):    [x]  intact    [x]  redness- no adverse reaction     []redness  adverse reaction:        15 min Manual Therapy: DTM to R quad and lateral hip, PROM R hip, MET to correct R ant innominate     Rationale:      decrease pain, increase ROM and increase tissue extensibility to improve patient's ability to perform ADLs without pain    35 min Therapeutic Exercise:  [x]  See flow sheet   Rationale:      increase ROM and increase strength to improve the patients ability to perform ADLs without pain          min Patient Education:  Yes    [x] Reviewed HEP   []  Progressed/Changed HEP based on: Other Objective/Functional Measures:    Pt with increased LBP after this weekend  Pt with R anterior innominate, indicated by lower R ASIS and longer R LE  Correction of R ant innominate reduced LBP    Post Treatment Pain Level (on 0 to 10) scale:   0  / 10     ASSESSMENT  Assessment/Changes in Function:     Pt with improving ROM and pain     []  See Progress Note/Recertification   Patient will continue to benefit from skilled PT services to modify and progress therapeutic interventions, address functional mobility deficits, address ROM deficits, address strength deficits, analyze and address soft tissue restrictions, analyze and cue movement patterns and analyze and modify body mechanics/ergonomics to attain remaining goals.    Progress toward goals / Updated goals:    Less pain and increased ROM     PLAN  [x]  Upgrade activities as tolerated YES Continue plan of care   []  Discharge due to :    []  Other:      Therapist: Rere Fabian PT    Date: 11/7/2017 Time: 4:22 PM        Future Appointments  Date Time Provider Shahbaz Quintero   11/9/2017 4:00 PM Rere Fabian PT [facility]

## 2017-11-09 ENCOUNTER — HOSPITAL ENCOUNTER (OUTPATIENT)
Dept: PHYSICAL THERAPY | Age: 47
Discharge: HOME | End: 2017-11-09
Payer: COMMERCIAL

## 2017-11-09 PROCEDURE — 97140 MANUAL THERAPY 1/> REGIONS: CPT

## 2017-11-09 PROCEDURE — 97110 THERAPEUTIC EXERCISES: CPT

## 2017-11-09 NOTE — PROGRESS NOTES
PHYSICAL THERAPY - DAILY TREATMENT NOTE    Patient Name: Abhijit Solano        Date: 2017  : 1970    Patient  Verified: YES  Visit #:   16   of   20  Insurance: Payor: [employer] / Plan: Mar Young [id number] PPO / Product Type: PPO /      In time: 4:05 Out time: 5:10   Total Treatment Time: 72     Medicare Time Tracking (below)   Total Timed Codes (min):  - 1:1 Treatment Time:  -     TREATMENT AREA/ DIAGNOSIS = History of total right hip replacement [Z96.641]    SUBJECTIVE  Pain Level (on 0 to 10 scale):  2  / 10   Medication Changes/New allergies or changes in medical history, any new surgeries or procedures?     NO    If yes, update Summary List   Subjective Functional Status/Changes:  [x]  No changes reported     Functional improvement    Functional limitation       OBJECTIVE  Modalities Rationale:     decrease edema, decrease inflammation and decrease pain to improve patient's ability to perform ADLs without pain   min [] Estim, type/location:                                      []  att     []  unatt     []  w/US     []  w/ice    []  w/heat    min []  Mechanical Traction: type/lbs                   []  pro   []  sup   []  int   []  cont    []  before manual    []  after manual    min []  Ultrasound, settings/location:      min []  Iontophoresis w/ dexamethasone, location:                                               []  take home patch       []  in clinic   10 min [x]  Ice     []  Heat    location/position: To SI, R hip and R knee    min []  Vasopneumatic Device, press/temp:     min []  Other:    [x] Skin assessment post-treatment (if applicable):    [x]  intact    [x]  redness- no adverse reaction     []redness  adverse reaction:        10 min Manual Therapy: PA mobs to R and L SI joints, MET to correct R ant innominate    Rationale:      decrease pain, increase ROM and increase tissue extensibility to improve patient's ability to perform ADLs without pain    45 min Therapeutic Exercise:  [x]  See flow sheet   Rationale:      increase ROM and increase strength to improve the patients ability to perform ADLs without pain          min Patient Education:  Yes    [x] Reviewed HEP   []  Progressed/Changed HEP based on: Other Objective/Functional Measures:  R ASIS low, PSIS high and R LE longer  Pt with R ant innominate, corrected with MET     Post Treatment Pain Level (on 0 to 10) scale:   0  / 10     ASSESSMENT  Assessment/Changes in Function:     Needed VCs for correct lateral band walk      []  See Progress Note/Recertification   Patient will continue to benefit from skilled PT services to modify and progress therapeutic interventions, address functional mobility deficits, address ROM deficits, address strength deficits, analyze and address soft tissue restrictions, analyze and cue movement patterns, analyze and modify body mechanics/ergonomics and assess and modify postural abnormalities to attain remaining goals. Progress toward goals / Updated goals:    Improving strength     PLAN  [x]  Upgrade activities as tolerated YES Continue plan of care   []  Discharge due to :    []  Other:      Therapist: Sasha Barth, PT    Date: 11/9/2017 Time: 5:00 PM        No future appointments.

## 2017-12-04 ENCOUNTER — HOSPITAL ENCOUNTER (OUTPATIENT)
Dept: PHYSICAL THERAPY | Age: 47
Discharge: HOME | End: 2017-12-04
Payer: COMMERCIAL

## 2017-12-04 PROCEDURE — 97140 MANUAL THERAPY 1/> REGIONS: CPT

## 2017-12-04 PROCEDURE — 97110 THERAPEUTIC EXERCISES: CPT

## 2017-12-04 NOTE — PROGRESS NOTES
PHYSICAL THERAPY - DAILY TREATMENT NOTE    Patient Name: Alicia Yoon        Date: 2017  : 1970   YES Patient  Verified  Visit #:     Insurance: Payor: Atul Selina / Plan: Mar Young [id number] PPO / Product Type: PPO /      In time: 4:50pm Out time: 5:50pm   Total Treatment Time: 60/55     Medicare Time Tracking (below)   Total Timed Codes (min):  na 1:1 Treatment Time:  na     TREATMENT AREA =  History of total right hip replacement [Z96.641]  SUBJECTIVE  Pain Level (on 0 to 10 scale):  0  / 10   Medication Changes/New allergies or changes in medical history, any new surgeries or procedures? NO    If yes, update Summary List   Subjective Functional Status/Changes:  []  No changes reported     Pt has been easing back to the gym working out and swimming with no complaints of pain in R hip. Pt states tightness in IT-band with audible \"popping\". OBJECTIVE  Modalities Rationale:     PD   min [] Estim, type/location:                                      []  att     []  unatt     []  w/US     []  w/ice    []  w/heat    min []  Mechanical Traction: type/lbs                   []  pro   []  sup   []  int   []  cont    []  before manual    []  after manual    min []  Ultrasound, settings/location:      min []  Iontophoresis w/ dexamethasone, location:                                               []  take home patch       []  in clinic    min []  Ice     []  Heat    location/position:     min []  Vasopneumatic Device, press/temp:     min []  Other:    [] Skin assessment post-treatment (if applicable):    []  intact    []  redness- no adverse reaction     []redness  adverse reaction:        45 min Therapeutic Exercise:  [x]  See flow sheet   Rationale:      increase ROM and increase strength to improve the patients ability to regain functional mobility of R hip for return to recreational activities pain-free.     10 min Manual Therapy: STM to TFL & ITB    Rationale: decrease pain, increase ROM, increase tissue extensibility and decrease trigger points to improve the patient's ability to regain full functional mobility     min Therapeutic Activity:    Rationale:    increase strength, improve coordination and increase proprioception to improve the patients ability to      min Neuromuscular Re-ed:    Rationale:    improve coordination, improve balance and increase proprioception to improve the patients ability to      min Gait Training:    Rationale:       min Patient Education:  YES  Reviewed HEP   []  Progressed/Changed HEP based on: Other Objective/Functional Measures: Added 4in step downs, HR's, & SLR's. Post Treatment Pain Level (on 0 to 10) scale:   0  / 10     ASSESSMENT  Assessment/Changes in Function:   Patient continues to have difficulty with SL activities due to glut med weakness from maintaining a level pelvis. []  See Progress Note/Recertification   Patient will continue to benefit from skilled PT services to modify and progress therapeutic interventions, address functional mobility deficits, address ROM deficits, address strength deficits, analyze and address soft tissue restrictions, analyze and cue movement patterns, analyze and modify body mechanics/ergonomics and assess and modify postural abnormalities to attain remaining goals.    Progress toward goals / Updated goals:    Progressing towards LTG 1, LTG 2 & 3 met     PLAN  [x]  Upgrade activities as tolerated YES Continue plan of care   []  Discharge due to :    [x]  Other: Discussed plans to D/C within next two visits after establishing an HEP     Therapist: LUNA Krishnan    Date: 12/4/2017 Time: 6:33 PM     Future Appointments  Date Time Provider Shahbaz Quintero   12/11/2017 5:00 PM Darrion Camilo [facility]   12/18/2017 4:00 PM Juanpablo Reyes [facility]

## 2017-12-11 ENCOUNTER — APPOINTMENT (OUTPATIENT)
Dept: PHYSICAL THERAPY | Age: 47
End: 2017-12-11
Payer: COMMERCIAL

## 2017-12-18 ENCOUNTER — APPOINTMENT (OUTPATIENT)
Dept: PHYSICAL THERAPY | Age: 47
End: 2017-12-18
Payer: COMMERCIAL

## 2017-12-20 ENCOUNTER — HOSPITAL ENCOUNTER (OUTPATIENT)
Dept: PHYSICAL THERAPY | Age: 47
Discharge: HOME | End: 2017-12-20
Payer: COMMERCIAL

## 2017-12-20 PROCEDURE — 97110 THERAPEUTIC EXERCISES: CPT

## 2017-12-20 PROCEDURE — 97140 MANUAL THERAPY 1/> REGIONS: CPT

## 2017-12-20 NOTE — PROGRESS NOTES
PHYSICAL THERAPY - DAILY TREATMENT NOTE    Patient Name: Clotilde Arguello        Date: 2017  : 1970   YES Patient  Verified  Visit #:     Insurance: Payor: Jil Lynn / Plan: Mar Young [id number] PPO / Product Type: PPO /      In time: 5:00pm Out time: 6:05pm   Total Treatment Time:      Medicare Time Tracking (below)   Total Timed Codes (min):  na 1:1 Treatment Time:  na     TREATMENT AREA =  History of total right hip replacement [Z96.641]  SUBJECTIVE  Pain Level (on 0 to 10 scale):  3  / 10   Medication Changes/New allergies or changes in medical history, any new surgeries or procedures? NO    If yes, update Summary List   Subjective Functional Status/Changes:  []  No changes reported     Pt reports she has an \"audible popping\" sound when she walks and notices mild groin pain with inc swelling. Pt doesn't recall what she did that aggravated it but has stop going to the gym.         OBJECTIVE  Modalities Rationale:     decrease inflammation, decrease pain and increase tissue extensibility to improve patient's ability to perform functional ADLs   min [] Estim, type/location:                                      []  att     []  unatt     []  w/US     []  w/ice    []  w/heat    min []  Mechanical Traction: type/lbs                   []  pro   []  sup   []  int   []  cont    []  before manual    []  after manual    min []  Ultrasound, settings/location:      min []  Iontophoresis w/ dexamethasone, location:                                               []  take home patch       []  in clinic   10 min [x]  Ice     []  Heat    location/position: Supine to R knee and R hip post treatment     min []  Vasopneumatic Device, press/temp:     min []  Other:    [] Skin assessment post-treatment (if applicable):    []  intact    []  redness- no adverse reaction     []redness  adverse reaction:        35 min Therapeutic Exercise:  [x]  See flow sheet   Rationale:      increase ROM and increase strength to improve the patients ability to regain functional mobility of R hip for return to gym pain-free. 15 min Manual Therapy: Gentle STM to anterior/lateral R hip/quad & STM to ITB with FR   Rationale:      decrease pain, increase ROM, increase tissue extensibility and decrease trigger points to improve the patient's ability to regain full functional mobility     min Therapeutic Activity:    Rationale:    increase strength, improve coordination and increase proprioception to improve the patients ability to      min Neuromuscular Re-ed:    Rationale:    improve coordination, improve balance and increase proprioception to improve the patients ability to      min Gait Training:    Rationale:       min Patient Education:  YES  Reviewed HEP   []  Progressed/Changed HEP based on: Other Objective/Functional Measures: Added standing hip flexor stretch in parallel bars. Post Treatment Pain Level (on 0 to 10) scale:   2  / 10     ASSESSMENT  Assessment/Changes in Function:     See PN     []  See Progress Note/Recertification   Patient will continue to benefit from skilled PT services to modify and progress therapeutic interventions, address functional mobility deficits, address ROM deficits, address strength deficits, analyze and address soft tissue restrictions, analyze and cue movement patterns, analyze and modify body mechanics/ergonomics and assess and modify postural abnormalities to attain remaining goals.    Progress toward goals / Updated goals:    See PN     PLAN  [x]  Upgrade activities as tolerated YES Continue plan of care   []  Discharge due to :    []  Other:      Therapist: LUNA Hewitt    Date: 12/20/2017 Time: 3:20 PM     Future Appointments  Date Time Provider Shahbaz Quintero   12/20/2017 5:00 PM Juanpablo Ga[facility] [address]

## 2017-12-26 ENCOUNTER — HOSPITAL ENCOUNTER (OUTPATIENT)
Dept: PHYSICAL THERAPY | Age: 47
Discharge: HOME | End: 2017-12-26
Payer: COMMERCIAL

## 2017-12-26 PROCEDURE — 97110 THERAPEUTIC EXERCISES: CPT

## 2017-12-26 PROCEDURE — 97140 MANUAL THERAPY 1/> REGIONS: CPT

## 2017-12-27 NOTE — PROGRESS NOTES
PHYSICAL THERAPY - DAILY TREATMENT NOTE    Patient Name: Abhijit Solano        Date: 2017  : 1970   YES Patient  Verified  Visit #:   20   of   20  Insurance: Payor: Arnulfo Verdin / Plan: Mar Young [id number] PPO / Product Type: PPO /      In time: 5:00pm Out time: 6:05pm   Total Treatment Time:      Medicare Time Tracking (below)   Total Timed Codes (min):  na 1:1 Treatment Time:  na     TREATMENT AREA =  History of total right hip replacement [Z96.641]  SUBJECTIVE  Pain Level (on 0 to 10 scale):  1  / 10   Medication Changes/New allergies or changes in medical history, any new surgeries or procedures? NO    If yes, update Summary List   Subjective Functional Status/Changes:  []  No changes reported     \"The pain has improved, but I still get that popping in the front of my hip when I walk. \" Pt reports the popping isn't painful but loud and annoying. Pt has been walking in her supportive shoes and noticed that the inflammation has gone down since last visit.            OBJECTIVE  Modalities Rationale:     PD   min [] Estim, type/location:                                      []  att     []  unatt     []  w/US     []  w/ice    []  w/heat    min []  Mechanical Traction: type/lbs                   []  pro   []  sup   []  int   []  cont    []  before manual    []  after manual    min []  Ultrasound, settings/location:      min []  Iontophoresis w/ dexamethasone, location:                                               []  take home patch       []  in clinic    min []  Ice     []  Heat    location/position:     min []  Vasopneumatic Device, press/temp:     min []  Other:    [] Skin assessment post-treatment (if applicable):    []  intact    []  redness- no adverse reaction     []redness  adverse reaction:        45 min Therapeutic Exercise:  [x]  See flow sheet   Rationale:      increase ROM and increase strength to improve the patients ability to regain functional mobility of R hip for return to gym pain-free. 10 min Manual Therapy: Gentle STM to anterior/lateral R hip/quad & STM to ITB    Rationale:      decrease pain, increase ROM, increase tissue extensibility and decrease trigger points to improve the patient's ability to regain full functional mobility     min Therapeutic Activity:    Rationale:    increase strength, improve coordination and increase proprioception to improve the patients ability to      min Neuromuscular Re-ed:    Rationale:    improve coordination, improve balance and increase proprioception to improve the patients ability to      min Gait Training:    Rationale:       min Patient Education:  YES  Reviewed HEP   []  Progressed/Changed HEP based on: Other Objective/Functional Measures: Added Hip 3 way in parallel bars. Post Treatment Pain Level (on 0 to 10) scale:   1  / 10     ASSESSMENT  Assessment/Changes in Function:   Unable to produce \"popping\" in therapy while ambulating. Patient does have glut med weakness and fatigues quickly with SL activities. VC's on glut med engagement during heel strike. Encourage patient to ambulate with supportive shoes and to monitor inflammation with ice until \"popping\" decreases. []  See Progress Note/Recertification   Patient will continue to benefit from skilled PT services to modify and progress therapeutic interventions, address functional mobility deficits, address ROM deficits, address strength deficits, analyze and address soft tissue restrictions, analyze and cue movement patterns, analyze and modify body mechanics/ergonomics and assess and modify postural abnormalities to attain remaining goals. Progress toward goals / Updated goals:    Progressing towards newly established LTG's. PLAN  [x]  Upgrade activities as tolerated YES Continue plan of care   []  Discharge due to :    []  Other:      Therapist: LUNA Pike    Date: 12/26/2017 Time: 8:01 PM     No future appointments.

## 2018-07-09 NOTE — PROGRESS NOTES
[address] Rao [address]  [facility] [[email or web address]] PHYSICAL THERAPY AT [facility] [address] [place], [address]  Phone: [phone]  Fax: [phone]  DISCHARGE SUMMARY  Patient Name: Azalea Fleischer : 1970   Treatment/Medical Diagnosis: History of total right hip replacement [Z96.641]   Referral Source: Margy Mehta MD     Date of Initial Visit: 17 Attended Visits: 20 Missed Visits: 3     SUMMARY OF TREATMENT  Therapeutic exercises including ROM,strengthening and stretching; manual therapy; gait and balance training; postural reeducation; modalities: CP; HEP instructions. CURRENT STATUS  Pt was last seen in the clinic 17 and will be discharged at this time. At last PN (17): Akhil Salazar a 52 y.o. female with a chief c/o R hip pain, up to 4/10, following R THR, on 17. Mrs. Froilan Delgado has been making steady progress overall while attending the local gym 2-3x/week with no issues, however had a family emergency and was unable to attend therapy for two weeks. She came to therapy c/o having an \"audible pop\"  that wasn't painful in her R hip when ambulating, and doesn't recall anything that she did to aggravate it. Pt currently works long hours at a desk and hasn't been going to the gym since having R hip discomfort. Pain improves with ice/ rest and educated patient on self ITB work & hip flexor stretches at home. Mrs. Aiken was able to demonstrate proper gait mechanics in the clinic with cueing on glut engagement and noted no \"audible pop\" present and/or pain. Encourage patient to ice, perform gentle strengthening/stretches at home, and to wear supportive shoes to help decrease inflammation until next follow-up appointment in therapy. See below for updated goals.             Goal/Measure of Progress Goal Met? 1.  Pain free ADLs   Status at last Eval: Pain < 3/10 Current Status: Pain @ worse is 3/10 but AV 0-1/10  progressing   2.   Increase R SLS to > 10 sec to indicate increased balance and stability   Status at last Eval: 10 sec Current Status: 30 sec  yes   3. Independent with ADLs   Status at last Eval: TBD Current Status: Indep yes     Recommendations: Discharge to [facility]. If you have any questions/comments please contact us directly at [phone]. Thank you for allowing us to assist in the care of your patient.     LPTA Signature: Jude Hagan Date: 7/9/18   Therapist Signature:  Time: 12:19 PM

## 2022-03-19 PROBLEM — M16.11 OSTEOARTHRITIS OF RIGHT HIP: Status: ACTIVE | Noted: 2017-08-20

## 2023-05-30 RX ORDER — OXYCODONE HYDROCHLORIDE AND ACETAMINOPHEN 5; 325 MG/1; MG/1
TABLET ORAL
COMMUNITY
Start: 2017-08-21

## 2023-05-30 RX ORDER — FLUTICASONE PROPIONATE 50 MCG
2 SPRAY, SUSPENSION (ML) NASAL PRN
COMMUNITY

## 2023-05-30 RX ORDER — FEXOFENADINE HCL 180 MG/1
180 TABLET ORAL DAILY
COMMUNITY

## 2023-05-30 RX ORDER — UREA 10 %
1 LOTION (ML) TOPICAL PRN
COMMUNITY

## 2023-05-30 RX ORDER — POLYETHYLENE GLYCOL 3350 17 G/17G
17 POWDER, FOR SOLUTION ORAL DAILY
COMMUNITY

## 2024-10-08 NOTE — ANESTHESIA POSTPROCEDURE EVALUATION
Post-Anesthesia Evaluation and Assessment    Cardiovascular Function/Vital Signs  Visit Vitals    /60 (BP 1 Location: Right arm, BP Patient Position: At rest)    Pulse 84    Temp 37.2 °C (99 °F)    Resp 18    Ht 5' 8\" (1.727 m)    Wt 95.4 kg (210 lb 6.4 oz)    SpO2 100%    BMI 31.99 kg/m2       Patient is status post Procedure(s):  RIGHT TOTAL HIP REPLACEMENT ANTERIOR APPROACH W/C-ARM. Nausea/Vomiting: Controlled. Postoperative hydration reviewed and adequate. Pain:  Pain Scale 1: Numeric (0 - 10) (08/22/17 0951)  Pain Intensity 1: 1 (08/22/17 0951)   Managed. Neurological Status:   Neuro (WDL): Exceptions to WDL (08/21/17 0925)   At baseline. Mental Status and Level of Consciousness: Arousable. Pulmonary Status:   O2 Device: Room air (08/21/17 1949)   Adequate oxygenation and airway patent. Complications related to anesthesia: None    Post-anesthesia assessment completed. No concerns. Patient has met all discharge requirements.     Signed By: Héctor Monteiro MD    August 31, 2017 Yes...

## (undated) DEVICE — REM POLYHESIVE ADULT PATIENT RETURN ELECTRODE: Brand: VALLEYLAB

## (undated) DEVICE — NEEDLE SPNL 20GA L3.5IN YEL HUB S STL REG WALL FIT STYL W/

## (undated) DEVICE — PLUS HANDPIECE WITH SPRAY TIP: Brand: SURGILAV

## (undated) DEVICE — SOL IRRIGATION INJ NACL 0.9% 500ML BTL

## (undated) DEVICE — SOLUTION SCRB 4OZ 10% PVP I POVIDONE IOD TOP PAINT EXIDINE

## (undated) DEVICE — STERILE POLYISOPRENE POWDER-FREE SURGICAL GLOVES WITH EMOLLIENT COATING: Brand: PROTEXIS

## (undated) DEVICE — SUTURE STRATAFIX SPRL MCRYL + SZ 3-0 L18IN ABSRB UD PS-2 SXMP1B107

## (undated) DEVICE — NDL PRT INJ NSAF BLNT 18GX1.5 --

## (undated) DEVICE — BLADE SAW 1.27X13X90 MM FOR LG BNE

## (undated) DEVICE — KENDALL SCD EXPRESS SLEEVES, KNEE LENGTH, MEDIUM: Brand: KENDALL SCD

## (undated) DEVICE — DRSG MEPILEX BORDER AG 4X8 -- 5/BX

## (undated) DEVICE — THE CANADY HYBRID PLASMA SCALPEL IS AN ELECTROSURGICAL PLASMA SCALPEL THAT USES AN 85MM BENDABLE PADDLE BLADE TIP. THE ELECTROSURGICAL PLASMA SCALPEL IS USED TO SIMULTANEOUSLY CUT AND COAGULATE BIOLOGICAL TISSUE.: Brand: CANADY HYBRID PLASMA PADDLE BLADE

## (undated) DEVICE — SUT VCRL + 1 36IN CT1 VIO --

## (undated) DEVICE — Device

## (undated) DEVICE — STERILE POLYISOPRENE POWDER-FREE SURGICAL GLOVES: Brand: PROTEXIS

## (undated) DEVICE — SYSTEM SKIN CLSR 22CM DERMBND PRINEO

## (undated) DEVICE — (D)SYR 10ML 1/5ML GRAD NSAF -- PKGING CHANGE USE ITEM 338027

## (undated) DEVICE — SOL INJ L R 1000ML BG --

## (undated) DEVICE — TRAY PHAR SYR 30ML PLAS LUERLOCK TIP N CTRL CONVENIENCE

## (undated) DEVICE — PACK PROCEDURE SURG TOT HIP ANTR CARTER CUST

## (undated) DEVICE — SYR 3ML LL TIP 1/10ML GRAD --

## (undated) DEVICE — (D)PREP SKN CHLRAPRP APPL 26ML -- CONVERT TO ITEM 371833

## (undated) DEVICE — GOWN,SIRUS,NONRNF,SETINSLV,XL,20/CS: Brand: MEDLINE